# Patient Record
Sex: FEMALE | Race: OTHER | Employment: FULL TIME | URBAN - METROPOLITAN AREA
[De-identification: names, ages, dates, MRNs, and addresses within clinical notes are randomized per-mention and may not be internally consistent; named-entity substitution may affect disease eponyms.]

---

## 2023-04-11 ENCOUNTER — ULTRASOUND ENCOUNTER (OUTPATIENT)
Facility: CLINIC | Age: 28
End: 2023-04-11
Payer: COMMERCIAL

## 2023-04-11 ENCOUNTER — INITIAL PRENATAL (OUTPATIENT)
Facility: CLINIC | Age: 28
End: 2023-04-11
Payer: COMMERCIAL

## 2023-04-11 VITALS
SYSTOLIC BLOOD PRESSURE: 116 MMHG | DIASTOLIC BLOOD PRESSURE: 74 MMHG | HEART RATE: 81 BPM | BODY MASS INDEX: 30.86 KG/M2 | WEIGHT: 157.19 LBS | HEIGHT: 60 IN

## 2023-04-11 DIAGNOSIS — O36.80X0 PREGNANCY WITH INCONCLUSIVE FETAL VIABILITY, SINGLE OR UNSPECIFIED FETUS: Primary | ICD-10-CM

## 2023-04-11 DIAGNOSIS — Z3A.08 8 WEEKS GESTATION OF PREGNANCY: ICD-10-CM

## 2023-04-11 DIAGNOSIS — Z34.01 ENCOUNTER FOR SUPERVISION OF NORMAL FIRST PREGNANCY IN FIRST TRIMESTER: Primary | ICD-10-CM

## 2023-04-11 PROBLEM — O99.210 OBESITY AFFECTING PREGNANCY, ANTEPARTUM: Status: ACTIVE | Noted: 2023-04-11

## 2023-04-11 PROBLEM — O99.210 OBESITY AFFECTING PREGNANCY, ANTEPARTUM (HCC): Status: ACTIVE | Noted: 2023-04-11

## 2023-04-11 PROCEDURE — 87086 URINE CULTURE/COLONY COUNT: CPT | Performed by: OBSTETRICS & GYNECOLOGY

## 2023-04-11 PROCEDURE — 87591 N.GONORRHOEAE DNA AMP PROB: CPT | Performed by: OBSTETRICS & GYNECOLOGY

## 2023-04-11 PROCEDURE — 3074F SYST BP LT 130 MM HG: CPT | Performed by: OBSTETRICS & GYNECOLOGY

## 2023-04-11 PROCEDURE — 3078F DIAST BP <80 MM HG: CPT | Performed by: OBSTETRICS & GYNECOLOGY

## 2023-04-11 PROCEDURE — 87491 CHLMYD TRACH DNA AMP PROBE: CPT | Performed by: OBSTETRICS & GYNECOLOGY

## 2023-04-11 PROCEDURE — 3008F BODY MASS INDEX DOCD: CPT | Performed by: OBSTETRICS & GYNECOLOGY

## 2023-04-11 PROCEDURE — 76801 OB US < 14 WKS SINGLE FETUS: CPT | Performed by: OBSTETRICS & GYNECOLOGY

## 2023-04-11 NOTE — PROGRESS NOTES
New OB  - patient has nausea on and off, declines medication  - denies h/o hepatitis, HSV, blood transfusion  - EDC by LMP c/w 8w6d  - desires NIPT, will get next visit    1.  Obesity  - limit weight gain, diet modifications discussed  - L2U  - NST 36 weeks

## 2023-04-12 LAB
C TRACH DNA SPEC QL NAA+PROBE: NEGATIVE
N GONORRHOEA DNA SPEC QL NAA+PROBE: NEGATIVE

## 2023-04-17 ENCOUNTER — TELEPHONE (OUTPATIENT)
Facility: CLINIC | Age: 28
End: 2023-04-17

## 2023-04-21 ENCOUNTER — LABORATORY ENCOUNTER (OUTPATIENT)
Dept: LAB | Age: 28
End: 2023-04-21
Attending: OBSTETRICS & GYNECOLOGY
Payer: COMMERCIAL

## 2023-04-21 DIAGNOSIS — Z34.01 ENCOUNTER FOR SUPERVISION OF NORMAL FIRST PREGNANCY IN FIRST TRIMESTER: ICD-10-CM

## 2023-04-21 LAB
ALBUMIN SERPL-MCNC: 3.8 G/DL (ref 3.4–5)
ALBUMIN/GLOB SERPL: 0.9 {RATIO} (ref 1–2)
ALP LIVER SERPL-CCNC: 51 U/L
ALT SERPL-CCNC: 29 U/L
ANION GAP SERPL CALC-SCNC: 7 MMOL/L (ref 0–18)
ANTIBODY SCREEN: NEGATIVE
AST SERPL-CCNC: 15 U/L (ref 15–37)
BASOPHILS # BLD AUTO: 0.04 X10(3) UL (ref 0–0.2)
BASOPHILS NFR BLD AUTO: 0.4 %
BILIRUB SERPL-MCNC: 0.8 MG/DL (ref 0.1–2)
BUN BLD-MCNC: 15 MG/DL (ref 7–18)
CALCIUM BLD-MCNC: 9.2 MG/DL (ref 8.5–10.1)
CHLORIDE SERPL-SCNC: 106 MMOL/L (ref 98–112)
CO2 SERPL-SCNC: 23 MMOL/L (ref 21–32)
CREAT BLD-MCNC: 0.61 MG/DL
EOSINOPHIL # BLD AUTO: 0.31 X10(3) UL (ref 0–0.7)
EOSINOPHIL NFR BLD AUTO: 3 %
ERYTHROCYTE [DISTWIDTH] IN BLOOD BY AUTOMATED COUNT: 11.1 %
FASTING STATUS PATIENT QL REPORTED: NO
GFR SERPLBLD BASED ON 1.73 SQ M-ARVRAT: 124 ML/MIN/1.73M2 (ref 60–?)
GLOBULIN PLAS-MCNC: 4.1 G/DL (ref 2.8–4.4)
GLUCOSE BLD-MCNC: 105 MG/DL (ref 70–99)
HBV SURFACE AG SER-ACNC: <0.1 [IU]/L
HBV SURFACE AG SERPL QL IA: NONREACTIVE
HCT VFR BLD AUTO: 35.8 %
HCV AB SERPL QL IA: NONREACTIVE
HGB BLD-MCNC: 12.5 G/DL
IMM GRANULOCYTES # BLD AUTO: 0.03 X10(3) UL (ref 0–1)
IMM GRANULOCYTES NFR BLD: 0.3 %
LYMPHOCYTES # BLD AUTO: 2.24 X10(3) UL (ref 1–4)
LYMPHOCYTES NFR BLD AUTO: 21.4 %
MCH RBC QN AUTO: 32.1 PG (ref 26–34)
MCHC RBC AUTO-ENTMCNC: 34.9 G/DL (ref 31–37)
MCV RBC AUTO: 92 FL
MONOCYTES # BLD AUTO: 0.44 X10(3) UL (ref 0.1–1)
MONOCYTES NFR BLD AUTO: 4.2 %
NEUTROPHILS # BLD AUTO: 7.39 X10 (3) UL (ref 1.5–7.7)
NEUTROPHILS # BLD AUTO: 7.39 X10(3) UL (ref 1.5–7.7)
NEUTROPHILS NFR BLD AUTO: 70.7 %
OSMOLALITY SERPL CALC.SUM OF ELEC: 283 MOSM/KG (ref 275–295)
PLATELET # BLD AUTO: 298 10(3)UL (ref 150–450)
POTASSIUM SERPL-SCNC: 3.4 MMOL/L (ref 3.5–5.1)
PROT SERPL-MCNC: 7.9 G/DL (ref 6.4–8.2)
RBC # BLD AUTO: 3.89 X10(6)UL
RH BLOOD TYPE: POSITIVE
RUBV IGG SER-ACNC: 7 IU/ML (ref 10–?)
SODIUM SERPL-SCNC: 136 MMOL/L (ref 136–145)
T PALLIDUM AB SER QL IA: NONREACTIVE
WBC # BLD AUTO: 10.5 X10(3) UL (ref 4–11)

## 2023-04-21 PROCEDURE — 87340 HEPATITIS B SURFACE AG IA: CPT | Performed by: OBSTETRICS & GYNECOLOGY

## 2023-04-21 PROCEDURE — 85025 COMPLETE CBC W/AUTO DIFF WBC: CPT | Performed by: OBSTETRICS & GYNECOLOGY

## 2023-04-21 PROCEDURE — 86803 HEPATITIS C AB TEST: CPT | Performed by: OBSTETRICS & GYNECOLOGY

## 2023-04-21 PROCEDURE — 83036 HEMOGLOBIN GLYCOSYLATED A1C: CPT | Performed by: OBSTETRICS & GYNECOLOGY

## 2023-04-21 PROCEDURE — 80053 COMPREHEN METABOLIC PANEL: CPT | Performed by: OBSTETRICS & GYNECOLOGY

## 2023-04-21 PROCEDURE — 86780 TREPONEMA PALLIDUM: CPT | Performed by: OBSTETRICS & GYNECOLOGY

## 2023-04-21 PROCEDURE — 86850 RBC ANTIBODY SCREEN: CPT | Performed by: OBSTETRICS & GYNECOLOGY

## 2023-04-21 PROCEDURE — 86901 BLOOD TYPING SEROLOGIC RH(D): CPT | Performed by: OBSTETRICS & GYNECOLOGY

## 2023-04-21 PROCEDURE — 86762 RUBELLA ANTIBODY: CPT | Performed by: OBSTETRICS & GYNECOLOGY

## 2023-04-21 PROCEDURE — 87389 HIV-1 AG W/HIV-1&-2 AB AG IA: CPT | Performed by: OBSTETRICS & GYNECOLOGY

## 2023-04-21 PROCEDURE — 86900 BLOOD TYPING SEROLOGIC ABO: CPT | Performed by: OBSTETRICS & GYNECOLOGY

## 2023-04-23 LAB — HGBA1C: <4.2 %

## 2023-04-24 NOTE — PROGRESS NOTES
Discussed providers message: results- rubella equivocal and recommendation recheck rubella after delivery, patient verbalized understanding.

## 2023-05-09 ENCOUNTER — ROUTINE PRENATAL (OUTPATIENT)
Facility: CLINIC | Age: 28
End: 2023-05-09
Payer: COMMERCIAL

## 2023-05-09 ENCOUNTER — TELEPHONE (OUTPATIENT)
Facility: CLINIC | Age: 28
End: 2023-05-09

## 2023-05-09 VITALS
HEART RATE: 103 BPM | WEIGHT: 152 LBS | SYSTOLIC BLOOD PRESSURE: 102 MMHG | BODY MASS INDEX: 29.84 KG/M2 | HEIGHT: 60 IN | DIASTOLIC BLOOD PRESSURE: 62 MMHG

## 2023-05-09 DIAGNOSIS — Z34.01 ENCOUNTER FOR SUPERVISION OF NORMAL FIRST PREGNANCY IN FIRST TRIMESTER: Primary | ICD-10-CM

## 2023-05-09 LAB
GLUCOSE (URINE DIPSTICK): NEGATIVE MG/DL
MULTISTIX LOT#: NORMAL NUMERIC

## 2023-05-09 PROCEDURE — 81002 URINALYSIS NONAUTO W/O SCOPE: CPT | Performed by: OBSTETRICS & GYNECOLOGY

## 2023-05-09 PROCEDURE — 3078F DIAST BP <80 MM HG: CPT | Performed by: OBSTETRICS & GYNECOLOGY

## 2023-05-09 PROCEDURE — 3074F SYST BP LT 130 MM HG: CPT | Performed by: OBSTETRICS & GYNECOLOGY

## 2023-05-09 PROCEDURE — 3008F BODY MASS INDEX DOCD: CPT | Performed by: OBSTETRICS & GYNECOLOGY

## 2023-05-09 NOTE — TELEPHONE ENCOUNTER
Name//spelling verified by pt review of label  Tubes labeled legible in front of patient  NIPS drawn, pt tolerated well. INVITAE access, cost, result discussed. Pt. Daniela Rodas. Specimen collected and placed in bin at clinical workstation for further processing by clinical staff.       Pablito Espitia NIPT [[id number]]  Order signed by Dr. Rhiannon Cedeño

## 2023-05-16 ENCOUNTER — TELEPHONE (OUTPATIENT)
Facility: CLINIC | Age: 28
End: 2023-05-16

## 2023-05-16 LAB
AMB EXT MYRIAD TRISOMY 13: NEGATIVE
AMB EXT MYRIAD TRISOMY 18: NEGATIVE
AMB EXT MYRIAD TRISOMY 21: NEGATIVE

## 2023-05-16 NOTE — TELEPHONE ENCOUNTER
Gender result released in [address] [place] [address]. Patient verbalized understanding, agreed to and intend to comply with plan of care.

## 2023-06-07 PROBLEM — O09.899 RUBELLA NON-IMMUNE STATUS, ANTEPARTUM: Status: ACTIVE | Noted: 2023-06-07

## 2023-06-07 PROBLEM — O99.212 OBESITY AFFECTING PREGNANCY IN SECOND TRIMESTER (HCC): Status: ACTIVE | Noted: 2023-04-11

## 2023-06-07 PROBLEM — Z28.39 RUBELLA NON-IMMUNE STATUS, ANTEPARTUM (HCC): Status: ACTIVE | Noted: 2023-06-07

## 2023-06-07 PROBLEM — Z28.39 RUBELLA NON-IMMUNE STATUS, ANTEPARTUM: Status: ACTIVE | Noted: 2023-06-07

## 2023-06-07 PROBLEM — O09.899 RUBELLA NON-IMMUNE STATUS, ANTEPARTUM (HCC): Status: ACTIVE | Noted: 2023-06-07

## 2023-06-07 PROBLEM — O99.212 OBESITY AFFECTING PREGNANCY IN SECOND TRIMESTER: Status: ACTIVE | Noted: 2023-04-11

## 2023-06-07 PROBLEM — Z34.02 PREGNANCY, FIRST, SECOND TRIMESTER (HCC): Status: ACTIVE | Noted: 2023-06-07

## 2023-06-07 PROBLEM — Z34.02 PREGNANCY, FIRST, SECOND TRIMESTER: Status: ACTIVE | Noted: 2023-06-07

## 2023-06-07 PROBLEM — E66.3 OVERWEIGHT (BMI 25.0-29.9): Status: ACTIVE | Noted: 2023-04-11

## 2023-06-08 ENCOUNTER — ROUTINE PRENATAL (OUTPATIENT)
Facility: CLINIC | Age: 28
End: 2023-06-08
Payer: COMMERCIAL

## 2023-06-08 ENCOUNTER — TELEPHONE (OUTPATIENT)
Facility: CLINIC | Age: 28
End: 2023-06-08

## 2023-06-08 VITALS
BODY MASS INDEX: 30.43 KG/M2 | SYSTOLIC BLOOD PRESSURE: 114 MMHG | DIASTOLIC BLOOD PRESSURE: 70 MMHG | WEIGHT: 155 LBS | HEART RATE: 104 BPM | HEIGHT: 60 IN

## 2023-06-08 DIAGNOSIS — Z3A.17 17 WEEKS GESTATION OF PREGNANCY: ICD-10-CM

## 2023-06-08 DIAGNOSIS — O09.899 RUBELLA NON-IMMUNE STATUS, ANTEPARTUM: Primary | ICD-10-CM

## 2023-06-08 DIAGNOSIS — E66.3 OVERWEIGHT (BMI 25.0-29.9): ICD-10-CM

## 2023-06-08 DIAGNOSIS — Z36.89 ENCOUNTER FOR FETAL ANATOMIC SURVEY: Primary | ICD-10-CM

## 2023-06-08 DIAGNOSIS — Z36.89 ENCOUNTER FOR FETAL ANATOMIC SURVEY: ICD-10-CM

## 2023-06-08 DIAGNOSIS — Z34.02 PREGNANCY, FIRST, SECOND TRIMESTER: ICD-10-CM

## 2023-06-08 DIAGNOSIS — Z28.39 RUBELLA NON-IMMUNE STATUS, ANTEPARTUM: Primary | ICD-10-CM

## 2023-06-08 LAB
GLUCOSE (URINE DIPSTICK): NEGATIVE MG/DL
MULTISTIX LOT#: NORMAL NUMERIC
PROTEIN (URINE DIPSTICK): NEGATIVE MG/DL

## 2023-06-08 PROCEDURE — 81002 URINALYSIS NONAUTO W/O SCOPE: CPT | Performed by: OBSTETRICS & GYNECOLOGY

## 2023-06-08 PROCEDURE — 3008F BODY MASS INDEX DOCD: CPT | Performed by: OBSTETRICS & GYNECOLOGY

## 2023-06-08 PROCEDURE — 3078F DIAST BP <80 MM HG: CPT | Performed by: OBSTETRICS & GYNECOLOGY

## 2023-06-08 PROCEDURE — 3074F SYST BP LT 130 MM HG: CPT | Performed by: OBSTETRICS & GYNECOLOGY

## 2023-06-08 NOTE — PROGRESS NOTES
HAYDEN    Partner here  Patient is a teacher   No FM. No complaints    32year old  at 17w2d  LESLEE: 11/15/23 by LMP c/w 8w6d US  A+    -cfDNA neg, GIRL   -carrier screen - declines   -AFP - will consider   -Anatomy US at 20 wk discussed    Currently our ultrasonographer is on indeterminate leave   Options: Radiology Dept, [facility], OB office at [facility]   Patient prefers - [facility]. Order placed.    -classes, pre-registration, pediatrician, breast pump info    1. Overweight (pre-preg BMI 28)  -wt gain goal 15-25 lb. Up 10 lb. Discussed    2.  Rubella non immune  -MMR postpartum discussed     RTC 4 wk

## 2023-06-08 NOTE — PATIENT INSTRUCTIONS
AFP Level     There is an optional blood test that we can perform on you called \"AFP level. \" It is a chemical in the bloodstream produced by the pregnancy. It is done between 15-20 weeks gestation. The ideal time for testing is actually 16-18 weeks gestation. If the level is abnormally elevated, this can indicate the presence of abnormalities of the development of the brain and spinal cord such as anencephaly (lack of brain development) and spina bifida (exposed spinal cord). These anomalies can generally be seen on ultrasound. It can also be elevated in cases of normal fetal anatomy and can indicate an abnormal placenta. This may or may not be able to be detected on ultrasound. Please think about whether or not you would like to have this test done. When you decide when you would like to have this test done, please let us know. We must enter your exact gestational age and weight on the date of the blood draw for the test to be calculated accurately. Schedule 20 week fetal anatomy ultrasound    Limit weight gain 15-25 lb for entire pregnancy    You are not immune to [place]. An MMR vaccine will be offered to you in the hospital after delivery.

## 2023-06-08 NOTE — TELEPHONE ENCOUNTER
Cannot schedule fetal anatomy [facility]. They lost their ultrasonographer as well and no longer can do these exams. Order for [facility] Radiology placed. Diagnoses and all orders for this visit:    Encounter for fetal anatomic survey  -     US PREG 2ND 3RD TRIMESTER (CPT=76805);  Future

## 2023-06-08 NOTE — TELEPHONE ENCOUNTER
Pt states [facility] lost their sonographer that can do anatomy scans; pls place new order for [facility] Radiology; pls call pt when available to schedule.

## 2023-06-08 NOTE — TELEPHONE ENCOUNTER
Spoke with patient. She is aware Dr Emerita Bolanos placed a new order for her US to be done at [facility] radiology. Will call the schedule. Understanding verbalized.

## 2023-06-23 ENCOUNTER — TELEPHONE (OUTPATIENT)
Facility: CLINIC | Age: 28
End: 2023-06-23

## 2023-06-23 NOTE — TELEPHONE ENCOUNTER
Breast Pump order was received from [facility] [employer]. / [facility]. Order form was placed in doctors bin for signature.

## 2023-07-05 ENCOUNTER — HOSPITAL ENCOUNTER (OUTPATIENT)
Dept: ULTRASOUND IMAGING | Age: 28
Discharge: HOME | End: 2023-07-05
Attending: OBSTETRICS & GYNECOLOGY
Payer: COMMERCIAL

## 2023-07-05 DIAGNOSIS — Z36.89 ENCOUNTER FOR FETAL ANATOMIC SURVEY: ICD-10-CM

## 2023-07-05 PROCEDURE — 76805 OB US >/= 14 WKS SNGL FETUS: CPT | Performed by: OBSTETRICS & GYNECOLOGY

## 2023-07-07 ENCOUNTER — ROUTINE PRENATAL (OUTPATIENT)
Dept: OBGYN CLINIC | Facility: CLINIC | Age: 28
End: 2023-07-07
Payer: COMMERCIAL

## 2023-07-07 VITALS
WEIGHT: 158.75 LBS | BODY MASS INDEX: 31.17 KG/M2 | HEART RATE: 79 BPM | DIASTOLIC BLOOD PRESSURE: 72 MMHG | HEIGHT: 60 IN | SYSTOLIC BLOOD PRESSURE: 113 MMHG

## 2023-07-07 DIAGNOSIS — Z34.02 ENCOUNTER FOR SUPERVISION OF NORMAL FIRST PREGNANCY IN SECOND TRIMESTER: Primary | ICD-10-CM

## 2023-07-07 PROCEDURE — 3008F BODY MASS INDEX DOCD: CPT

## 2023-07-07 PROCEDURE — 3074F SYST BP LT 130 MM HG: CPT

## 2023-07-07 PROCEDURE — 81002 URINALYSIS NONAUTO W/O SCOPE: CPT

## 2023-07-07 PROCEDURE — 3078F DIAST BP <80 MM HG: CPT

## 2023-07-07 NOTE — PROGRESS NOTES
HAYDEN 21w2d    Pt doing well, no complaints     LESLEE: 11/15/23 by LMP c/w 8w6d US     -cfDNA neg, GIRL   -carrier screen - declines   -AFP - will consider   -Anatomy US at 20 wk -normal  -classes, pre-registration, pediatrician, breast pump info at prior visit      1. Overweight (pre-preg BMI 28)  -wt gain goal 15-25 lb. Up 10 lb. Discussed     2.  Rubella non immune  -MMR postpartum discussed

## 2023-07-27 ENCOUNTER — TELEPHONE (OUTPATIENT)
Facility: CLINIC | Age: 28
End: 2023-07-27

## 2023-07-27 NOTE — TELEPHONE ENCOUNTER
Pregnancy confirmation faxed to [employer] [facility] per pt's request.     Pt aware. Patient verbalized understanding.

## 2023-07-27 NOTE — TELEPHONE ENCOUNTER
Patient needs a letter with the LESLEE to the employer. Needs to be faxed.    Fax number: [phone]

## 2023-08-04 ENCOUNTER — ROUTINE PRENATAL (OUTPATIENT)
Facility: CLINIC | Age: 28
End: 2023-08-04
Payer: COMMERCIAL

## 2023-08-04 VITALS
HEIGHT: 60 IN | HEART RATE: 69 BPM | SYSTOLIC BLOOD PRESSURE: 100 MMHG | WEIGHT: 159.63 LBS | DIASTOLIC BLOOD PRESSURE: 60 MMHG | BODY MASS INDEX: 31.34 KG/M2

## 2023-08-04 DIAGNOSIS — Z34.92 ENCOUNTER FOR SUPERVISION OF NORMAL PREGNANCY IN SECOND TRIMESTER, UNSPECIFIED GRAVIDITY: Primary | ICD-10-CM

## 2023-08-04 PROCEDURE — 3074F SYST BP LT 130 MM HG: CPT | Performed by: OBSTETRICS & GYNECOLOGY

## 2023-08-04 PROCEDURE — 3008F BODY MASS INDEX DOCD: CPT | Performed by: OBSTETRICS & GYNECOLOGY

## 2023-08-04 PROCEDURE — 3078F DIAST BP <80 MM HG: CPT | Performed by: OBSTETRICS & GYNECOLOGY

## 2023-08-04 PROCEDURE — 81002 URINALYSIS NONAUTO W/O SCOPE: CPT | Performed by: OBSTETRICS & GYNECOLOGY

## 2023-08-04 NOTE — PROGRESS NOTES
She is aware that she needs a pediatrician has taken some classes through the hospital.  Her grandmother had diabetes the GCT and CBC will be ordered. No swelling.

## 2023-08-10 ENCOUNTER — TELEPHONE (OUTPATIENT)
Facility: CLINIC | Age: 28
End: 2023-08-10

## 2023-08-10 NOTE — TELEPHONE ENCOUNTER
Patient would like a note Faxed to her employer HR dept stating her estimated due date  Attention: Tete Tirado   Fax [phone]

## 2023-08-15 ENCOUNTER — LAB ENCOUNTER (OUTPATIENT)
Dept: LAB | Age: 28
End: 2023-08-15
Attending: OBSTETRICS & GYNECOLOGY
Payer: COMMERCIAL

## 2023-08-15 DIAGNOSIS — Z34.92 ENCOUNTER FOR SUPERVISION OF NORMAL PREGNANCY IN SECOND TRIMESTER, UNSPECIFIED GRAVIDITY: ICD-10-CM

## 2023-08-15 LAB
BASOPHILS # BLD AUTO: 0.03 X10(3) UL (ref 0–0.2)
BASOPHILS NFR BLD AUTO: 0.3 %
EOSINOPHIL # BLD AUTO: 0.36 X10(3) UL (ref 0–0.7)
EOSINOPHIL NFR BLD AUTO: 3.4 %
ERYTHROCYTE [DISTWIDTH] IN BLOOD BY AUTOMATED COUNT: 11.8 %
GLUCOSE 1H P GLC SERPL-MCNC: 116 MG/DL
HCT VFR BLD AUTO: 34.1 %
HGB BLD-MCNC: 11.8 G/DL
IMM GRANULOCYTES # BLD AUTO: 0.06 X10(3) UL (ref 0–1)
IMM GRANULOCYTES NFR BLD: 0.6 %
LYMPHOCYTES # BLD AUTO: 1.58 X10(3) UL (ref 1–4)
LYMPHOCYTES NFR BLD AUTO: 14.8 %
MCH RBC QN AUTO: 32.9 PG (ref 26–34)
MCHC RBC AUTO-ENTMCNC: 34.6 G/DL (ref 31–37)
MCV RBC AUTO: 95 FL
MONOCYTES # BLD AUTO: 0.53 X10(3) UL (ref 0.1–1)
MONOCYTES NFR BLD AUTO: 5 %
NEUTROPHILS # BLD AUTO: 8.09 X10 (3) UL (ref 1.5–7.7)
NEUTROPHILS # BLD AUTO: 8.09 X10(3) UL (ref 1.5–7.7)
NEUTROPHILS NFR BLD AUTO: 75.9 %
PLATELET # BLD AUTO: 269 10(3)UL (ref 150–450)
RBC # BLD AUTO: 3.59 X10(6)UL
WBC # BLD AUTO: 10.7 X10(3) UL (ref 4–11)

## 2023-08-15 PROCEDURE — 85025 COMPLETE CBC W/AUTO DIFF WBC: CPT

## 2023-08-15 PROCEDURE — 36415 COLL VENOUS BLD VENIPUNCTURE: CPT

## 2023-08-15 PROCEDURE — 87389 HIV-1 AG W/HIV-1&-2 AB AG IA: CPT

## 2023-08-15 PROCEDURE — 82950 GLUCOSE TEST: CPT

## 2023-08-23 ENCOUNTER — ROUTINE PRENATAL (OUTPATIENT)
Dept: OBGYN CLINIC | Facility: CLINIC | Age: 28
End: 2023-08-23
Payer: COMMERCIAL

## 2023-08-23 VITALS
HEIGHT: 60 IN | SYSTOLIC BLOOD PRESSURE: 121 MMHG | BODY MASS INDEX: 32.02 KG/M2 | DIASTOLIC BLOOD PRESSURE: 79 MMHG | HEART RATE: 86 BPM | WEIGHT: 163.13 LBS

## 2023-08-23 DIAGNOSIS — Z34.03 ENCOUNTER FOR SUPERVISION OF NORMAL FIRST PREGNANCY IN THIRD TRIMESTER: Primary | ICD-10-CM

## 2023-08-23 PROCEDURE — 90471 IMMUNIZATION ADMIN: CPT | Performed by: STUDENT IN AN ORGANIZED HEALTH CARE EDUCATION/TRAINING PROGRAM

## 2023-08-23 PROCEDURE — 3008F BODY MASS INDEX DOCD: CPT | Performed by: STUDENT IN AN ORGANIZED HEALTH CARE EDUCATION/TRAINING PROGRAM

## 2023-08-23 PROCEDURE — 90715 TDAP VACCINE 7 YRS/> IM: CPT | Performed by: STUDENT IN AN ORGANIZED HEALTH CARE EDUCATION/TRAINING PROGRAM

## 2023-08-23 PROCEDURE — 3078F DIAST BP <80 MM HG: CPT | Performed by: STUDENT IN AN ORGANIZED HEALTH CARE EDUCATION/TRAINING PROGRAM

## 2023-08-23 PROCEDURE — 3074F SYST BP LT 130 MM HG: CPT | Performed by: STUDENT IN AN ORGANIZED HEALTH CARE EDUCATION/TRAINING PROGRAM

## 2023-08-23 NOTE — PROGRESS NOTES
HAYDEN - 28w0d     Pt doing well, had a few \"hot flashes\" but otherwise no complaints     LESLEE: 11/15/23 by LMP c/w 8w6d US     -cfDNA neg, GIRL   -carrier screen - declines   -Anatomy US at 20 wk -normal  -1h GTT, CBC, HIV done  -classes, pre-registration, pediatrician, breast pump info at prior visit   -Tdap done     1. Overweight (pre-preg BMI 28)  -wt gain goal 15-25 lb Discussed     2.  Rubella non immune  -MMR postpartum discussed

## 2023-08-28 ENCOUNTER — TELEPHONE (OUTPATIENT)
Facility: CLINIC | Age: 28
End: 2023-08-28

## 2023-09-05 ENCOUNTER — ROUTINE PRENATAL (OUTPATIENT)
Facility: CLINIC | Age: 28
End: 2023-09-05
Payer: COMMERCIAL

## 2023-09-05 VITALS
WEIGHT: 162 LBS | BODY MASS INDEX: 32 KG/M2 | SYSTOLIC BLOOD PRESSURE: 100 MMHG | HEART RATE: 77 BPM | DIASTOLIC BLOOD PRESSURE: 62 MMHG

## 2023-09-05 DIAGNOSIS — Z34.03 ENCOUNTER FOR SUPERVISION OF NORMAL FIRST PREGNANCY IN THIRD TRIMESTER: Primary | ICD-10-CM

## 2023-09-05 PROCEDURE — 3074F SYST BP LT 130 MM HG: CPT

## 2023-09-05 PROCEDURE — 3078F DIAST BP <80 MM HG: CPT

## 2023-09-05 NOTE — PROGRESS NOTES
HAYDEN 29w6d    She is feeling good, no complaints     LESLEE: 11/15/23 by LMP c/w 8w6d US     -cfDNA neg, GIRL   -carrier screen - declines   -Anatomy US at 20 wk -normal  -1h GTT, CBC, HIV done  -classes, pre-registration, pediatrician, breast pump info at prior visit   -Tdap done     1. Overweight (pre-preg BMI 28)  -wt gain goal 15-25 lb Discussed     2.  Rubella non immune  -MMR postpartum discussed

## 2023-09-06 NOTE — TELEPHONE ENCOUNTER
Called patient he will have labs done in [facility]    O.V. summary received and placed in doctors bin for review and recommendations.

## 2023-09-18 ENCOUNTER — ROUTINE PRENATAL (OUTPATIENT)
Facility: CLINIC | Age: 28
End: 2023-09-18
Payer: COMMERCIAL

## 2023-09-18 VITALS
WEIGHT: 165.63 LBS | BODY MASS INDEX: 32.52 KG/M2 | HEART RATE: 78 BPM | HEIGHT: 60 IN | SYSTOLIC BLOOD PRESSURE: 116 MMHG | DIASTOLIC BLOOD PRESSURE: 72 MMHG

## 2023-09-18 DIAGNOSIS — Z34.03 ENCOUNTER FOR SUPERVISION OF NORMAL FIRST PREGNANCY IN THIRD TRIMESTER: Primary | ICD-10-CM

## 2023-10-03 ENCOUNTER — ROUTINE PRENATAL (OUTPATIENT)
Facility: CLINIC | Age: 28
End: 2023-10-03
Payer: COMMERCIAL

## 2023-10-03 VITALS
DIASTOLIC BLOOD PRESSURE: 60 MMHG | SYSTOLIC BLOOD PRESSURE: 110 MMHG | BODY MASS INDEX: 33.45 KG/M2 | WEIGHT: 170.38 LBS | HEIGHT: 60 IN | HEART RATE: 64 BPM

## 2023-10-03 DIAGNOSIS — Z23 NEED FOR VACCINATION: ICD-10-CM

## 2023-10-03 DIAGNOSIS — Z34.03 ENCOUNTER FOR SUPERVISION OF NORMAL FIRST PREGNANCY IN THIRD TRIMESTER: Primary | ICD-10-CM

## 2023-10-03 PROCEDURE — 3078F DIAST BP <80 MM HG: CPT

## 2023-10-03 PROCEDURE — 90471 IMMUNIZATION ADMIN: CPT | Performed by: OBSTETRICS & GYNECOLOGY

## 2023-10-03 PROCEDURE — 90686 IIV4 VACC NO PRSV 0.5 ML IM: CPT | Performed by: OBSTETRICS & GYNECOLOGY

## 2023-10-03 PROCEDURE — 3074F SYST BP LT 130 MM HG: CPT

## 2023-10-03 PROCEDURE — 3008F BODY MASS INDEX DOCD: CPT

## 2023-10-03 NOTE — PROGRESS NOTES
HAYDEN 33w6d    She has some swelling in lower extremities - reassured patient. LESLEE: 11/15/23 by LMP c/w 8w6d US     -cfDNA neg, GIRL   -carrier screen - declines   -Anatomy US at 20 wk -normal  -1h GTT, CBC, HIV done  -classes, pre-registration, pediatrician, breast pump - done  -Tdap done  -flu vaccination given today      1. Overweight (pre-preg BMI 28)  -wt gain goal 15-25 lb Discussed     2.  Rubella non immune  -MMR postpartum discussed

## 2023-10-19 ENCOUNTER — ROUTINE PRENATAL (OUTPATIENT)
Dept: OBGYN CLINIC | Facility: CLINIC | Age: 28
End: 2023-10-19
Payer: COMMERCIAL

## 2023-10-19 VITALS
BODY MASS INDEX: 33.33 KG/M2 | HEART RATE: 86 BPM | SYSTOLIC BLOOD PRESSURE: 136 MMHG | WEIGHT: 169.75 LBS | DIASTOLIC BLOOD PRESSURE: 87 MMHG | HEIGHT: 60 IN

## 2023-10-19 DIAGNOSIS — Z34.03 ENCOUNTER FOR SUPERVISION OF NORMAL FIRST PREGNANCY IN THIRD TRIMESTER: Primary | ICD-10-CM

## 2023-10-19 DIAGNOSIS — Z36.89 DETERMINE FETAL PRESENTATION USING ULTRASOUND: ICD-10-CM

## 2023-10-19 PROCEDURE — 3075F SYST BP GE 130 - 139MM HG: CPT | Performed by: STUDENT IN AN ORGANIZED HEALTH CARE EDUCATION/TRAINING PROGRAM

## 2023-10-19 PROCEDURE — 87653 STREP B DNA AMP PROBE: CPT | Performed by: STUDENT IN AN ORGANIZED HEALTH CARE EDUCATION/TRAINING PROGRAM

## 2023-10-19 PROCEDURE — 87081 CULTURE SCREEN ONLY: CPT | Performed by: STUDENT IN AN ORGANIZED HEALTH CARE EDUCATION/TRAINING PROGRAM

## 2023-10-19 PROCEDURE — 3079F DIAST BP 80-89 MM HG: CPT | Performed by: STUDENT IN AN ORGANIZED HEALTH CARE EDUCATION/TRAINING PROGRAM

## 2023-10-19 PROCEDURE — 3008F BODY MASS INDEX DOCD: CPT | Performed by: STUDENT IN AN ORGANIZED HEALTH CARE EDUCATION/TRAINING PROGRAM

## 2023-10-19 NOTE — PROGRESS NOTES
HAYDEN - 36w1d   Pt doing well, no major complaints. Baby active  SVE - closed, unable to tell presentation - will get US with next visit      LESLEE: 11/15/23 by LMP c/w 8w6d US     -cfDNA neg, GIRL   -carrier screen - declines   -Anatomy US at 20 wk -normal  -1h GTT, CBC, HIV done  -classes, pre-registration, pediatrician, breast pump - done  -Tdap done  -flu vaccination given  -GBS done     1. Overweight (pre-preg BMI 28)  -wt gain goal 15-25 lb Discussed     2.  Rubella non immune  -MMR postpartum discussed

## 2023-10-21 LAB — GROUP B STREP BY PCR FOR PCR OVT: NEGATIVE

## 2023-10-25 ENCOUNTER — ROUTINE PRENATAL (OUTPATIENT)
Facility: CLINIC | Age: 28
End: 2023-10-25

## 2023-10-25 ENCOUNTER — ULTRASOUND ENCOUNTER (OUTPATIENT)
Facility: CLINIC | Age: 28
End: 2023-10-25

## 2023-10-25 VITALS
SYSTOLIC BLOOD PRESSURE: 122 MMHG | DIASTOLIC BLOOD PRESSURE: 76 MMHG | HEART RATE: 75 BPM | WEIGHT: 171.81 LBS | HEIGHT: 60 IN | BODY MASS INDEX: 33.73 KG/M2

## 2023-10-25 DIAGNOSIS — Z34.03 ENCOUNTER FOR SUPERVISION OF NORMAL FIRST PREGNANCY IN THIRD TRIMESTER: Primary | ICD-10-CM

## 2023-10-25 PROCEDURE — 3078F DIAST BP <80 MM HG: CPT

## 2023-10-25 PROCEDURE — 76816 OB US FOLLOW-UP PER FETUS: CPT | Performed by: OBSTETRICS & GYNECOLOGY

## 2023-10-25 PROCEDURE — 3074F SYST BP LT 130 MM HG: CPT

## 2023-10-25 PROCEDURE — 3008F BODY MASS INDEX DOCD: CPT

## 2023-10-25 NOTE — PROGRESS NOTES
HAYDEN 37w0d    She is doing well, no complaints. SVE - closed/ 40/-3 - US done today to check presentation - results pending. LESLEE: 11/15/23 by LMP c/w 8w6d US     -cfDNA neg, GIRL   -carrier screen - declines   -Anatomy US at 20 wk -normal  -1h GTT, CBC, HIV done  -classes, pre-registration, pediatrician, breast pump - done  -Tdap done  -flu vaccination given  -GBS done     1. Overweight (pre-preg BMI 28)  -wt gain goal 15-25 lb Discussed     2.  Rubella non immune  -MMR postpartum discussed

## 2023-10-31 ENCOUNTER — ROUTINE PRENATAL (OUTPATIENT)
Facility: CLINIC | Age: 28
End: 2023-10-31

## 2023-10-31 VITALS
HEART RATE: 60 BPM | BODY MASS INDEX: 33.54 KG/M2 | DIASTOLIC BLOOD PRESSURE: 82 MMHG | SYSTOLIC BLOOD PRESSURE: 110 MMHG | HEIGHT: 60 IN | WEIGHT: 170.81 LBS

## 2023-10-31 DIAGNOSIS — E66.3 OVERWEIGHT (BMI 25.0-29.9): ICD-10-CM

## 2023-10-31 DIAGNOSIS — O09.899 RUBELLA NON-IMMUNE STATUS, ANTEPARTUM: ICD-10-CM

## 2023-10-31 DIAGNOSIS — Z28.39 RUBELLA NON-IMMUNE STATUS, ANTEPARTUM: ICD-10-CM

## 2023-10-31 DIAGNOSIS — Z36.89 DETERMINE FETAL PRESENTATION USING ULTRASOUND: ICD-10-CM

## 2023-10-31 DIAGNOSIS — Z34.03 PREGNANCY, FIRST, THIRD TRIMESTER: ICD-10-CM

## 2023-10-31 DIAGNOSIS — O26.03 EXCESSIVE WEIGHT GAIN DURING PREGNANCY IN THIRD TRIMESTER: Primary | ICD-10-CM

## 2023-10-31 PROCEDURE — 3008F BODY MASS INDEX DOCD: CPT | Performed by: OBSTETRICS & GYNECOLOGY

## 2023-10-31 PROCEDURE — 3079F DIAST BP 80-89 MM HG: CPT | Performed by: OBSTETRICS & GYNECOLOGY

## 2023-10-31 PROCEDURE — 3074F SYST BP LT 130 MM HG: CPT | Performed by: OBSTETRICS & GYNECOLOGY

## 2023-10-31 NOTE — PATIENT INSTRUCTIONS
Consider induction of labor  Induction of labor at 39-40 wk. Recent studies show best outcomes for mothers and infants in most pregnancies with delivery at this gestational age. Fetal lung maturity should be adequate. Avoiding additional fetal weight gain and an aging placenta are the other benefits. There is a lower  rate for inductions between 39-40 weeks compared with expectant management at that time. Erythromycin eye ointment     In the , the risk of richard gonococcal conjunctivitis is reduced by prophylactic administration of ophthalmic antibiotic agents shortly after birth. Ocular prophylaxis is safe, easy to administer, and an inexpensive method to prevent sight-threatening gonococcal ophthalmia. In the [place], routine prophylaxis against gonococcal ophthalmia neonatorum with erythromycin ophthalmic ointment is recommended by the [employer], the [employer] of Pediatrics (AAP), and the [employer] for Disease Control and Prevention (CDC). The ophthalmic antibiotic should be applied in each eye within two hours of birth, regardless of mode of delivery. UpToDate accessed 2023    Vitamin K injection     Prophylactic administration of vitamin K (as a single dose given intramuscularly within 6 hours of birth) is recommended for all newborns to prevent vitamin K deficient bleeding (VKDB; previously referred to as hemorrhagic disease of the ). If you choose to refuse vitamin K for your (s) you will be asked to sign a consent form at the hospital that will include the following:    Informed Refusal of Vitamin K for the [place]  At birth, all infants are deficient in Vitamin K. Vitamin K is necessary for blood to form a clot. Vitamin  K deficiency can result in bleeding during the first days or weeks of life (hemorrhagic disease of the )  and infants who are exclusively breast fed are at greatest risk.  In the most severe cases, the bleeding occurs  into the brain (4 to 7 / 100,000 births) and can result in permanent brain damage. The American Academy of  Pediatrics recommends a Vitamin K injection at birth (into the tissue beneath the skin) which can greatly  reduce the risk of hemorrhage. Oral forms are not readily available in the [place] and have not been shown to be  as effective as the injection. I have read the above information provided and have had all my questions answered regarding my  decision to refuse the treatment of my  infant on the grounds that the treatment violates my  personal/[employer] beliefs. I understand that my refusal to consent to the recommended treatment may  seriously impair the health of my infant up to and including bleeding into the brain or death. While I  understand the possible consequences, I nevertheless refuse to submit the recommended treatment. I  assume responsibility for the consequences of this refusal and release [employer] [employer] [employer], its affiliates  and subsidiaries, its employees and agents and the physicians or midwifes from any and all liability associated  with my refusal to permit treatment. More information:     A single IM dose of vitamin K appears to be more effective in preventing late-onset VKDB compared with oral vitamin K regimens. Parental refusal - The rate of parental refusal of  vitamin K appears to be increasing in the [place] and other countries. Approximately one-half of severe cases of VKDB are associated with parental refusal of vitamin K therapy during the birth hospitalization.  In a national surveillance study from [facility], there were six cases of fatal VKDB-related intracranial hemorrhage reported over 24 years; three of the six cases occurred in infants who were not given vitamin K due to parental refusal.    It is imperative that parents are counseled about the potential dire consequences of VKDB (eg, intestinal hemorrhage, intracranial hemorrhage (stroke) with subsequent neurodevelopmental impairment and possible death) if their  does not receive vitamin K. VITAMIN K-DEFICIENT BLEEDING IN NEWBORNS AND YOUNG INFANTS  Vitamin K deficiency is common in the , and if vitamin K is not replaced, the infant is at risk for vitamin K deficiency bleeding (VKDB), previously known as hemorrhagic disease of the . Pathogenesis --  infants are at risk for vitamin K deficiency because their immature liver does not efficiently utilize vitamin K. In addition, they tend to have low vitamin K stores because of the low vitamin K content of breast milk, a sterile gut, and poor placental transfer of vitamin K. In infants, the plasma concentrations of all vitamin K-dependent factors are approximately 20 percent of the adult values. Within a month after birth, the levels rise to within normal limits. Clinical features -- VKDB is characterized by cutaneous bruising or bleeding from mucosal surfaces, the gastrointestinal tract, umbilicus or circumcision site, and/or intracranial hemorrhage (ICH) (stroke). ? Early-onset VKDB develops within the first 24 hours of life and is usually associated with maternal medications that block vitamin K action (eg, anticonvulsants). It is associated with ICH in approximately 25 percent of affected infants     ? Classic VKDB develops between the second and seventh day of life and is largely prevented by administration of vitamin K at birth     ? Late-onset VKDB typically develops between three weeks and eight months of age.  There is a high frequency of ICH in affected infants (eg, 50 percent in some series), and associated central nervous system symptoms such as vomiting or seizures may be the primary presenting symptoms     Late-onset VKDB and associated ICH appear to be increasing in the [place], and these cases are associated with the parental refusal of vitamin K prophylaxis at birth, followed by exclusive breast feeding. Evaluation -- The possibility of VKDB should be considered in an infant presenting during the first six months of life with bleeding, bruising, lethargy, or fussiness, especially if they are exclusively  or did not receive vitamin K prophylaxis at birth. If there is a strong clinical suspicion of VKDB (eg, clinically apparent bleeding and known refusal of vitamin K prophylaxis), the infant should be treated immediately, even before test results are available. Evaluation should include laboratory testing of prothrombin time (PT) and international normalized ratio (INR), both of which are prolonged in vitamin K deficiency. Infants with abnormal results or neurologic symptoms should have urgent neuroimaging to detect brain hemorrhage. A complete blood count with platelets and fibrinogen should also be performed to exclude other causes of bleeding. Prevention -- All  infants are given vitamin K shortly after birth to prevent VKDB. Treatment -- A presumptive diagnosis of VKDB should be made in an infant presenting with bleeding or neurologic symptoms and either prolonged PT or INR, or a history of not receiving vitamin K prophylaxis at birth. Such infants should be treated immediately with parenteral vitamin K (phytonadione), 1 to 2 mg intravenously or subcutaneously. The vitamin K dose should normalize the coagulation profile within two to three hours. For severe bleeding episodes, fresh frozen plasma or prothrombin complex concentration may be administered in addition to vitamin K.     UpToDate accessed 2023

## 2023-10-31 NOTE — PROGRESS NOTES
Ramesh Mary [id number]    Partner here   +FM. Mild tightenings. No leaking fluid or vaginal bleeding. 32year old  at 41w10d   LESLEE: 11/15/23 by LMP c/w 8w6d US  A+/GBS neg     -cfDNA neg, GIRL   -carrier screen - declines   -Anatomy US normal   -classes, pre-registration, pediatrician, breast pump, car-seat discussed   -vitamin K & erythromycin eye ointment discussed.   -Tdap & Flu done  -New  COVID booster encouraged   -Cervix fingertip (could push finger through to 1 ES)/78%/-7, cephalic  at 2:72 PM     IOL at 39-40 wk discussed. Pt in agreement. Message sent to  for cytotec induction      Overweight (pre-preg BMI 28) - upper limits of weight gain  -wt gain goal 15-25 lb. Up 25 lb   -Cautioned regarding increased risk of fetal macrosomia, birth injury for fetus and mother, need for  section. Rubella non immune  -MMR postpartum discussed     Unable to determine fetal presentation  -42/19 US - Cephalic.  VINCENT 12.3 cm     RTC 1 wk
What Type Of Note Output Would You Prefer (Optional)?: Standard Output
What Is The Reason For Today's Visit?: Full Body Skin Examination
What Is The Reason For Today's Visit? (Being Monitored For X): concerning skin lesions on an annual basis

## 2023-11-01 ENCOUNTER — TELEPHONE (OUTPATIENT)
Facility: CLINIC | Age: 28
End: 2023-11-01

## 2023-11-06 ENCOUNTER — ROUTINE PRENATAL (OUTPATIENT)
Dept: OBGYN CLINIC | Facility: CLINIC | Age: 28
End: 2023-11-06
Payer: COMMERCIAL

## 2023-11-06 VITALS
HEIGHT: 60 IN | SYSTOLIC BLOOD PRESSURE: 145 MMHG | BODY MASS INDEX: 33.33 KG/M2 | HEART RATE: 67 BPM | DIASTOLIC BLOOD PRESSURE: 85 MMHG | WEIGHT: 169.75 LBS

## 2023-11-06 DIAGNOSIS — Z34.03 PREGNANCY, FIRST, THIRD TRIMESTER: Primary | ICD-10-CM

## 2023-11-06 NOTE — PROGRESS NOTES
HAYDEN 38w5d    She is doing well, no complaints. Labor precautions discussed. SVE today fingertip/70/-3. IOL scheduled for  Cytotec and pitocin . LESLEE: 11/15/23 by LMP c/w 8w6d US  A+   -cfDNA neg, GIRL   -carrier screen - declines   -Anatomy US normal   -classes, pre-registration, pediatrician, breast pump, car-seat discussed   -vitamin K & erythromycin eye ointment discussed.   -Tdap & Flu done       Overweight (pre-preg BMI 28) - upper limits of weight gain  -wt gain goal 15-25 lb. Up 25 lb   -Cautioned regarding increased risk of fetal macrosomia, birth injury for fetus and mother, need for  section at prior visit. Rubella non immune  -MMR postpartum discussed      Unable to determine fetal presentation  -/ US - Cephalic.  VINCENT 12.3 cm

## 2023-11-11 ENCOUNTER — TELEPHONE (OUTPATIENT)
Facility: CLINIC | Age: 28
End: 2023-11-11

## 2023-11-12 NOTE — TELEPHONE ENCOUNTER
On call physician    32year old  at 39w3d     OB Hx: Overweight (pre-preg BMI 28), rubella non immune    Paging on call provider c/o had a spell this evening that she thought could be low BP. Was walking around her house  Felt kind of mental fog. Didn't remember sending some texts  Slightly shaky  Cold, clammy hand. Sat down for about 20 min to rest.   Ate 2 dates around 35 min ago. Last meal was at 12:30 at lunch today. No current contractions, leaking, bleeding. Last night around 0130 was having a menstrual like cramping for 2-3 hours. Just discomfort, not painful. +FM. Reviewed sounds like hypoglycemia.    Encouraged her to eat more frequently as it had been probably about 6+ hours since her last real meal.     Patient expressed understanding    Windy Tanner MD

## 2023-11-14 ENCOUNTER — HOSPITAL ENCOUNTER (INPATIENT)
Facility: HOSPITAL | Age: 28
LOS: 3 days | Discharge: HOME | End: 2023-11-17
Attending: OBSTETRICS & GYNECOLOGY | Admitting: OBSTETRICS & GYNECOLOGY
Payer: COMMERCIAL

## 2023-11-14 ENCOUNTER — ROUTINE PRENATAL (OUTPATIENT)
Facility: CLINIC | Age: 28
End: 2023-11-14
Payer: COMMERCIAL

## 2023-11-14 VITALS
HEIGHT: 60 IN | HEART RATE: 80 BPM | SYSTOLIC BLOOD PRESSURE: 138 MMHG | DIASTOLIC BLOOD PRESSURE: 88 MMHG | BODY MASS INDEX: 33.3 KG/M2 | WEIGHT: 169.63 LBS

## 2023-11-14 DIAGNOSIS — O09.899 RUBELLA NON-IMMUNE STATUS, ANTEPARTUM: ICD-10-CM

## 2023-11-14 DIAGNOSIS — Z28.39 RUBELLA NON-IMMUNE STATUS, ANTEPARTUM: ICD-10-CM

## 2023-11-14 DIAGNOSIS — O26.03 EXCESSIVE WEIGHT GAIN DURING PREGNANCY IN THIRD TRIMESTER: ICD-10-CM

## 2023-11-14 DIAGNOSIS — O16.3 ELEVATED BLOOD PRESSURE AFFECTING PREGNANCY IN THIRD TRIMESTER, ANTEPARTUM: Primary | ICD-10-CM

## 2023-11-14 DIAGNOSIS — Z34.03 PREGNANCY, FIRST, THIRD TRIMESTER: ICD-10-CM

## 2023-11-14 DIAGNOSIS — E66.3 OVERWEIGHT (BMI 25.0-29.9): ICD-10-CM

## 2023-11-14 PROBLEM — Z34.90 PREGNANCY: Status: ACTIVE | Noted: 2023-11-14

## 2023-11-14 PROBLEM — Z34.90 PREGNANCY (HCC): Status: ACTIVE | Noted: 2023-11-14

## 2023-11-14 LAB
ALBUMIN SERPL-MCNC: 3.4 G/DL (ref 3.4–5)
ALBUMIN/GLOB SERPL: 0.8 {RATIO} (ref 1–2)
ALP LIVER SERPL-CCNC: 131 U/L
ALT SERPL-CCNC: 13 U/L
ANION GAP SERPL CALC-SCNC: 9 MMOL/L (ref 0–18)
ANTIBODY SCREEN: NEGATIVE
AST SERPL-CCNC: 18 U/L (ref 15–37)
BASOPHILS # BLD AUTO: 0.05 X10(3) UL (ref 0–0.2)
BASOPHILS NFR BLD AUTO: 0.5 %
BILIRUB SERPL-MCNC: 0.8 MG/DL (ref 0.1–2)
BUN BLD-MCNC: 15 MG/DL (ref 9–23)
CALCIUM BLD-MCNC: 9.4 MG/DL (ref 8.5–10.1)
CHLORIDE SERPL-SCNC: 105 MMOL/L (ref 98–112)
CO2 SERPL-SCNC: 20 MMOL/L (ref 21–32)
CREAT BLD-MCNC: 0.83 MG/DL
CREAT UR-SCNC: 66.7 MG/DL
EGFRCR SERPLBLD CKD-EPI 2021: 98 ML/MIN/1.73M2 (ref 60–?)
EOSINOPHIL # BLD AUTO: 0.06 X10(3) UL (ref 0–0.7)
EOSINOPHIL NFR BLD AUTO: 0.6 %
ERYTHROCYTE [DISTWIDTH] IN BLOOD BY AUTOMATED COUNT: 11.9 %
FASTING STATUS PATIENT QL REPORTED: NO
GLOBULIN PLAS-MCNC: 4.5 G/DL (ref 2.8–4.4)
GLUCOSE BLD-MCNC: 72 MG/DL (ref 70–99)
HCT VFR BLD AUTO: 37.7 %
HGB BLD-MCNC: 13.7 G/DL
HIV 1+2 AB+HIV1 P24 AG SERPL QL IA: NONREACTIVE
IMM GRANULOCYTES # BLD AUTO: 0.07 X10(3) UL (ref 0–1)
IMM GRANULOCYTES NFR BLD: 0.6 %
LYMPHOCYTES # BLD AUTO: 2.36 X10(3) UL (ref 1–4)
LYMPHOCYTES NFR BLD AUTO: 21.7 %
MCH RBC QN AUTO: 31.5 PG (ref 26–34)
MCHC RBC AUTO-ENTMCNC: 36.3 G/DL (ref 31–37)
MCV RBC AUTO: 86.7 FL
MONOCYTES # BLD AUTO: 0.49 X10(3) UL (ref 0.1–1)
MONOCYTES NFR BLD AUTO: 4.5 %
NEUTROPHILS # BLD AUTO: 7.85 X10 (3) UL (ref 1.5–7.7)
NEUTROPHILS # BLD AUTO: 7.85 X10(3) UL (ref 1.5–7.7)
NEUTROPHILS NFR BLD AUTO: 72.1 %
OSMOLALITY SERPL CALC.SUM OF ELEC: 277 MOSM/KG (ref 275–295)
PLATELET # BLD AUTO: 270 10(3)UL (ref 150–450)
POTASSIUM SERPL-SCNC: 3.8 MMOL/L (ref 3.5–5.1)
PROT SERPL-MCNC: 7.9 G/DL (ref 6.4–8.2)
PROT UR-MCNC: 22 MG/DL
PROT/CREAT UR-RTO: 0.33
RBC # BLD AUTO: 4.35 X10(6)UL
RH BLOOD TYPE: POSITIVE
SODIUM SERPL-SCNC: 134 MMOL/L (ref 136–145)
T PALLIDUM AB SER QL IA: NONREACTIVE
URATE SERPL-MCNC: 6.6 MG/DL
WBC # BLD AUTO: 10.9 X10(3) UL (ref 4–11)

## 2023-11-14 PROCEDURE — 3075F SYST BP GE 130 - 139MM HG: CPT | Performed by: OBSTETRICS & GYNECOLOGY

## 2023-11-14 PROCEDURE — 59025 FETAL NON-STRESS TEST: CPT | Performed by: OBSTETRICS & GYNECOLOGY

## 2023-11-14 PROCEDURE — 3E0P7VZ INTRODUCTION OF HORMONE INTO FEMALE REPRODUCTIVE, VIA NATURAL OR ARTIFICIAL OPENING: ICD-10-PCS | Performed by: OBSTETRICS & GYNECOLOGY

## 2023-11-14 PROCEDURE — 3E033VJ INTRODUCTION OF OTHER HORMONE INTO PERIPHERAL VEIN, PERCUTANEOUS APPROACH: ICD-10-PCS | Performed by: OBSTETRICS & GYNECOLOGY

## 2023-11-14 PROCEDURE — 3008F BODY MASS INDEX DOCD: CPT | Performed by: OBSTETRICS & GYNECOLOGY

## 2023-11-14 PROCEDURE — 3079F DIAST BP 80-89 MM HG: CPT | Performed by: OBSTETRICS & GYNECOLOGY

## 2023-11-14 RX ORDER — ACETAMINOPHEN 500 MG
500 TABLET ORAL EVERY 6 HOURS PRN
Status: DISCONTINUED | OUTPATIENT
Start: 2023-11-14 | End: 2023-11-15

## 2023-11-14 RX ORDER — IBUPROFEN 600 MG/1
600 TABLET ORAL ONCE AS NEEDED
Status: DISCONTINUED | OUTPATIENT
Start: 2023-11-14 | End: 2023-11-15

## 2023-11-14 RX ORDER — ONDANSETRON 2 MG/ML
4 INJECTION INTRAMUSCULAR; INTRAVENOUS EVERY 6 HOURS PRN
Status: DISCONTINUED | OUTPATIENT
Start: 2023-11-14 | End: 2023-11-15

## 2023-11-14 RX ORDER — HYDROMORPHONE HYDROCHLORIDE 1 MG/ML
1 INJECTION, SOLUTION INTRAMUSCULAR; INTRAVENOUS; SUBCUTANEOUS EVERY 2 HOUR PRN
Status: COMPLETED | OUTPATIENT
Start: 2023-11-14 | End: 2023-11-15

## 2023-11-14 RX ORDER — TERBUTALINE SULFATE 1 MG/ML
0.25 INJECTION, SOLUTION SUBCUTANEOUS AS NEEDED
Status: DISCONTINUED | OUTPATIENT
Start: 2023-11-14 | End: 2023-11-15

## 2023-11-14 RX ORDER — CITRIC ACID/SODIUM CITRATE 334-500MG
30 SOLUTION, ORAL ORAL AS NEEDED
Status: DISCONTINUED | OUTPATIENT
Start: 2023-11-14 | End: 2023-11-15

## 2023-11-14 RX ORDER — BUPIVACAINE HCL/0.9 % NACL/PF 0.25 %
5 PLASTIC BAG, INJECTION (ML) EPIDURAL AS NEEDED
Status: DISCONTINUED | OUTPATIENT
Start: 2023-11-14 | End: 2023-11-15

## 2023-11-14 RX ORDER — ACETAMINOPHEN 500 MG
1000 TABLET ORAL EVERY 6 HOURS PRN
Status: DISCONTINUED | OUTPATIENT
Start: 2023-11-14 | End: 2023-11-15

## 2023-11-14 RX ORDER — SODIUM CHLORIDE, SODIUM LACTATE, POTASSIUM CHLORIDE, CALCIUM CHLORIDE 600; 310; 30; 20 MG/100ML; MG/100ML; MG/100ML; MG/100ML
INJECTION, SOLUTION INTRAVENOUS CONTINUOUS
Status: DISCONTINUED | OUTPATIENT
Start: 2023-11-14 | End: 2023-11-15

## 2023-11-14 RX ORDER — NALBUPHINE HYDROCHLORIDE 10 MG/ML
2.5 INJECTION, SOLUTION INTRAMUSCULAR; INTRAVENOUS; SUBCUTANEOUS
Status: DISCONTINUED | OUTPATIENT
Start: 2023-11-14 | End: 2023-11-15

## 2023-11-14 RX ORDER — DEXTROSE, SODIUM CHLORIDE, SODIUM LACTATE, POTASSIUM CHLORIDE, AND CALCIUM CHLORIDE 5; .6; .31; .03; .02 G/100ML; G/100ML; G/100ML; G/100ML; G/100ML
INJECTION, SOLUTION INTRAVENOUS AS NEEDED
Status: DISCONTINUED | OUTPATIENT
Start: 2023-11-14 | End: 2023-11-15

## 2023-11-14 NOTE — PROGRESS NOTES
Ramesh Mary [id number]    Partner here   +FM. Mild tightenings. No leaking fluid or vaginal bleeding. No headaches. No vision changes. No epigastric pain. Vitals:    23 1503   BP: 138/88   Pulse: 80   Weight: 169 lb 9.6 oz (76.9 kg)   Height: 61"     32year old  at 37w11d   LESLEE: 11/15/23 by LMP c/w 8w6d US  A+/GBS neg     -cfDNA neg, GIRL   -carrier screen - declines   -Anatomy US normal   -classes, pre-registration, pediatrician, breast pump, car-seat discussed   -vitamin K & erythromycin eye ointment discussed.   -Tdap & Flu done  -New  COVID booster encouraged   -Cervix 1+/14%/-8 cephalic  at 8:32 PM. +Some malodor. ?BV vs leaking fluid? IOL at 39-40 wk discussed. Scheduled 23 for misoprostol at 40+ weeks but will send to L&D now. Elevated blood pressure  -23 /88 -> repeat 145/85. No labs were ordered  -23 /88 at 39w6d. No symptoms of pre-eclampsia. Advise she go to L&D now for labs, serial BP, probable induction of labor. Will be postdates  -NST today, 2023 at 39w6d - reactive NST      Overweight (pre-preg BMI 28) - upper limits of weight gain  -wt gain goal 15-25 lb. Up 24 lb   -Cautioned regarding increased risk of fetal macrosomia, birth injury for fetus and mother, need for  section. Rubella non immune  -MMR postpartum discussed     Unable to determine fetal presentation  - US - Cephalic. VINCENT 12.3 cm     To L&D for labs, serial BP, probable induction of labor.  Discussed with L&D charge RN

## 2023-11-15 ENCOUNTER — ANESTHESIA EVENT (OUTPATIENT)
Dept: OBGYN UNIT | Facility: HOSPITAL | Age: 28
End: 2023-11-15
Payer: COMMERCIAL

## 2023-11-15 ENCOUNTER — ANESTHESIA (OUTPATIENT)
Dept: OBGYN UNIT | Facility: HOSPITAL | Age: 28
End: 2023-11-15
Payer: COMMERCIAL

## 2023-11-15 PROCEDURE — 0DQR0ZZ REPAIR ANAL SPHINCTER, OPEN APPROACH: ICD-10-PCS | Performed by: OBSTETRICS & GYNECOLOGY

## 2023-11-15 PROCEDURE — 59400 OBSTETRICAL CARE: CPT | Performed by: OBSTETRICS & GYNECOLOGY

## 2023-11-15 PROCEDURE — 10907ZC DRAINAGE OF AMNIOTIC FLUID, THERAPEUTIC FROM PRODUCTS OF CONCEPTION, VIA NATURAL OR ARTIFICIAL OPENING: ICD-10-PCS | Performed by: OBSTETRICS & GYNECOLOGY

## 2023-11-15 RX ORDER — DOCUSATE SODIUM 100 MG/1
100 CAPSULE, LIQUID FILLED ORAL
Status: DISCONTINUED | OUTPATIENT
Start: 2023-11-15 | End: 2023-11-17

## 2023-11-15 RX ORDER — ACETAMINOPHEN 500 MG
1000 TABLET ORAL EVERY 6 HOURS PRN
Status: DISCONTINUED | OUTPATIENT
Start: 2023-11-15 | End: 2023-11-17

## 2023-11-15 RX ORDER — BISACODYL 10 MG
10 SUPPOSITORY, RECTAL RECTAL ONCE AS NEEDED
Status: DISCONTINUED | OUTPATIENT
Start: 2023-11-15 | End: 2023-11-17

## 2023-11-15 RX ORDER — IBUPROFEN 600 MG/1
600 TABLET ORAL EVERY 6 HOURS
Status: DISCONTINUED | OUTPATIENT
Start: 2023-11-15 | End: 2023-11-17

## 2023-11-15 RX ORDER — TRANEXAMIC ACID 10 MG/ML
INJECTION, SOLUTION INTRAVENOUS
Status: COMPLETED
Start: 2023-11-15 | End: 2023-11-15

## 2023-11-15 RX ORDER — BUPIVACAINE HYDROCHLORIDE 2.5 MG/ML
INJECTION, SOLUTION EPIDURAL; INFILTRATION; INTRACAUDAL AS NEEDED
Status: DISCONTINUED | OUTPATIENT
Start: 2023-11-15 | End: 2023-11-15 | Stop reason: SURG

## 2023-11-15 RX ORDER — LIDOCAINE HYDROCHLORIDE AND EPINEPHRINE 15; 5 MG/ML; UG/ML
INJECTION, SOLUTION EPIDURAL AS NEEDED
Status: DISCONTINUED | OUTPATIENT
Start: 2023-11-15 | End: 2023-11-15 | Stop reason: SURG

## 2023-11-15 RX ORDER — ACETAMINOPHEN 500 MG
500 TABLET ORAL EVERY 6 HOURS PRN
Status: DISCONTINUED | OUTPATIENT
Start: 2023-11-15 | End: 2023-11-17

## 2023-11-15 RX ORDER — HYDROMORPHONE HYDROCHLORIDE 1 MG/ML
INJECTION, SOLUTION INTRAMUSCULAR; INTRAVENOUS; SUBCUTANEOUS
Status: DISPENSED
Start: 2023-11-15 | End: 2023-11-16

## 2023-11-15 RX ORDER — METHYLERGONOVINE MALEATE 0.2 MG/ML
INJECTION INTRAVENOUS
Status: COMPLETED
Start: 2023-11-15 | End: 2023-11-15

## 2023-11-15 RX ORDER — SIMETHICONE 80 MG
80 TABLET,CHEWABLE ORAL 3 TIMES DAILY PRN
Status: DISCONTINUED | OUTPATIENT
Start: 2023-11-15 | End: 2023-11-17

## 2023-11-15 RX ADMIN — BUPIVACAINE HYDROCHLORIDE 5 ML: 2.5 INJECTION, SOLUTION EPIDURAL; INFILTRATION; INTRACAUDAL at 04:50:00

## 2023-11-15 RX ADMIN — LIDOCAINE HYDROCHLORIDE AND EPINEPHRINE 3 ML: 15; 5 INJECTION, SOLUTION EPIDURAL at 04:49:00

## 2023-11-15 NOTE — PLAN OF CARE
Problem: Patient/Family Goals  Goal: Patient/Family Long Term Goal  Description: Uncomplicated vaginal delivery    Interventions:  VS per protocol  I&O  Ice chips and sips as tolerated  EFM per protocol  Maintain IV as ordered  Antibiotics as needed per protocol  Informed consent  Patient's Long Term Goal:     Interventions:  - See additional Care Plan goals for specific interventions  Outcome: Completed  Goal: Patient/Family Short Term Goal  Description: Adequate pain control with delivery of infant  Interventions:  Pain assessment scores as ordered  Patient scores pain a \"3\" or less  Multidisciplinary care   Nonpharmacologic comfort measures    Patient's Short Term Goal:     Interventions:     - See additional Care Plan goals for specific interventions  Outcome: Completed     Problem: BIRTH - VAGINAL/ SECTION  Goal: Fetal and maternal status remain reassuring during the birth process  Description: INTERVENTIONS:  - Monitor vital signs  - Monitor fetal heart rate  - Monitor uterine activity  - Monitor labor progression (vaginal delivery)  - DVT prophylaxis (C/S delivery)  - Surgical antibiotic prophylaxis (C/S delivery)  Outcome: Completed     Problem: PAIN - ADULT  Goal: Verbalizes/displays adequate comfort level or patient's stated pain goal  Description: INTERVENTIONS:  - Encourage pt to monitor pain and request assistance  - Assess pain using appropriate pain scale  - Administer analgesics based on type and severity of pain and evaluate response  - Implement non-pharmacological measures as appropriate and evaluate response  - Consider cultural and social influences on pain and pain management  - Manage/alleviate anxiety  - Utilize distraction and/or relaxation techniques  - Monitor for opioid side effects  - Notify MD/LIP if interventions unsuccessful or patient reports new pain  - Anticipate increased pain with activity and pre-medicate as appropriate  Outcome: Progressing     Problem: ANXIETY  Goal: Will report anxiety at manageable levels  Description: INTERVENTIONS:  - Administer medication as ordered  - Teach and rehearse alternative coping skills  - Provide emotional support with 1:1 interaction with staff  Outcome: Progressing     Problem: SAFETY ADULT - FALL  Goal: Free from fall injury  Description: INTERVENTIONS:  - Assess pt frequently for physical needs  - Identify cognitive and physical deficits and behaviors that affect risk of falls.   - [place] fall precautions as indicated by assessment.  - Educate pt/family on patient safety including physical limitations  - Instruct pt to call for assistance with activity based on assessment  - Modify environment to reduce risk of injury  - Provide assistive devices as appropriate  - Consider OT/PT consult to assist with strengthening/mobility  - Encourage toileting schedule  Outcome: Progressing

## 2023-11-15 NOTE — L&D DELIVERY NOTE
Jadon, Girl [[id number]]      Labor Events     labor?: No   steroids?: None  Antibiotics received during labor?: No  Rupture date/time: 11/15/2023 0905     Rupture type: AROM  Fluid color: Clear  Labor type: Induced Onset of Labor  Induction: Misoprostol  Indications for induction: Preeclampsia  Intrapartum & labor complications:  Other Excessive Bleeding       Labor Length    1st stage: 12h 08m  2nd stage: 1h 22m  3rd stage: 0h 03m       Labor Event Times    Labor onset date/time: 2023 2300  Dilation complete date/time: 11/15/2023 1108  Start pushing date/time: 11/15/2023 1116        Presentation    Presentation: Vertex  Position: Occiput Anterior       Operative Delivery    Operative Vaginal Delivery: No                      Shoulder Dystocia    Shoulder Dystocia: No             Anesthesia    Method: Epidural              [place] Delivery      Delivery date/time:  11/15/23 12:30:32   Delivery type: Normal spontaneous vaginal delivery    Details:     Delivery location: delivery room       Delivery Providers    Delivering Clinician: Andrew Hernandez DO   Delivery personnel:  Provider Role   Sen Cheung RN Baby Nurse   David Bautista RN Delivery Nurse             Cord    Vessels: 3 Vessels  Complications: None  # of loops: 0  Timed cord clamping: Yes  Time in sec: 30  Cord blood disposition: to lab  Gases sent?: No       Resuscitation    Method: None       [place] Measurements      Weight: 3350 g 7 lb 6.2 oz Length: 49.5 cm     Head circum.: 34.5 cm Chest circum.: 33 cm          Abdominal circum.: 30.5 cm           Placenta    Date/time: 11/15/2023 1234  Removal: Spontaneous  Appearance: Intact  Disposition: held for future pathology       Apgars    Living status: Living   Apgar Scoring Key:    0 1 2    Skin color Blue or pale Acrocyanotic Completely pink    Heart rate Absent <100 bpm >100 bpm    Reflex irritability No response Grimace Cry or active withdrawal    Muscle tone Limp Some flexion Active motion    Respiratory effort Absent Weak cry; hypoventilation Good, crying              1 Minute:  5 Minute:  10 Minute:  15 Minute:  20 Minute:      Skin color: 0  1       Heart rate: 2  2       Reflex irritablity: 2  2       Muscle tone: 2  2       Respiratory effort: 2  2       Total: 8  9          Apgars assigned by: Kristen Bianchi RN   disposition: with mother       Skin to Skin    Reason skin to skin not initiated: Maternal Acuity       Vaginal Count    Initial count RN: Bandar Zambrano RN  Initial count Tech: Byriene, Anastasiya   Sponges   Sharps    Initial counts 11   0    Final counts 41   4    Final count RN: Geri Berrios RN  Final count MD: Nelia Ahuja,        Delivery (Maternal)    Episiotomy: None  Perineal lacerations: 3rd Repaired?: Yes     Vaginal laceration?: Yes Repaired?: Yes     Cervical laceration?: No    Clitoral laceration?: No    Quantitative blood loss (mL): 1005              32 y.o.  at 36 weeks with  female infant , nuchal cord x1 delivered through. Body and shoulders easily delivered, bulb suctioned at perineum. Cord clamped x2 and cut after 30 seconds. Placenta delivered spontaneously, intact. Vaginal sulcus laceration and 3rd degree perineal laceration repaired with 2-0 chromic. QBL 1005, TXA given, METHEGEN given IM, vaginal packing placed.

## 2023-11-15 NOTE — ANESTHESIA PROCEDURE NOTES
Labor Analgesia    Date/Time: 11/15/2023 4:40 AM    Performed by: Jasmina Moreno MD  Authorized by: Jasmina Moreno MD      General Information and Staff    Start Time:  11/15/2023 4:40 AM  End Time:  11/15/2023 4:49 AM  Anesthesiologist:  Jasmina Moreno MD  Performed by:   Anesthesiologist  Patient Location:  OB  Site Identification: surface landmarks    Reason for Block: labor epidural    Preanesthetic Checklist: patient identified, IV checked, risks and benefits discussed, monitors and equipment checked, pre-op evaluation, timeout performed, IV bolus, anesthesia consent and sterile technique used      Procedure Details    Patient Position:  Sitting  Prep: ChloraPrep    Monitoring:  Heart rate and continuous pulse ox  Approach:  Midline    Epidural Needle    Injection Technique:  BERNADETTE saline  Needle Type:  Tuohy  Needle Gauge:  17 G  Needle Length:  3.375 in  Needle Insertion Depth:  6    Spinal Needle      Catheter    Catheter Type:  End hole  Catheter Size:  19 G  Catheter at Skin Depth:  11  Test Dose:  Negative    Assessment    Sensory Level:  T10    Additional Comments

## 2023-11-15 NOTE — PLAN OF CARE
Problem: Patient/Family Goals  Goal: Patient/Family Long Term Goal  Description: Uncomplicated vaginal delivery    Interventions:  VS per protocol  I&O  Ice chips and sips as tolerated  EFM per protocol  Maintain IV as ordered  Antibiotics as needed per protocol  Informed consent  Patient's Long Term Goal:     Interventions:  - See additional Care Plan goals for specific interventions  Outcome: Progressing  Goal: Patient/Family Short Term Goal  Description: Adequate pain control with delivery of infant  Interventions:  Pain assessment scores as ordered  Patient scores pain a \"3\" or less  Multidisciplinary care   Nonpharmacologic comfort measures    Patient's Short Term Goal:     Interventions:     - See additional Care Plan goals for specific interventions  Outcome: Progressing     Problem: BIRTH - VAGINAL/ SECTION  Goal: Fetal and maternal status remain reassuring during the birth process  Description: INTERVENTIONS:  - Monitor vital signs  - Monitor fetal heart rate  - Monitor uterine activity  - Monitor labor progression (vaginal delivery)  - DVT prophylaxis (C/S delivery)  - Surgical antibiotic prophylaxis (C/S delivery)  Outcome: Progressing     Problem: PAIN - ADULT  Goal: Verbalizes/displays adequate comfort level or patient's stated pain goal  Description: INTERVENTIONS:  - Encourage pt to monitor pain and request assistance  - Assess pain using appropriate pain scale  - Administer analgesics based on type and severity of pain and evaluate response  - Implement non-pharmacological measures as appropriate and evaluate response  - Consider cultural and social influences on pain and pain management  - Manage/alleviate anxiety  - Utilize distraction and/or relaxation techniques  - Monitor for opioid side effects  - Notify MD/LIP if interventions unsuccessful or patient reports new pain  - Anticipate increased pain with activity and pre-medicate as appropriate  Outcome: Progressing     Problem: ANXIETY  Goal: Will report anxiety at manageable levels  Description: INTERVENTIONS:  - Administer medication as ordered  - Teach and rehearse alternative coping skills  - Provide emotional support with 1:1 interaction with staff  Outcome: Progressing     Problem: SAFETY ADULT - FALL  Goal: Free from fall injury  Description: INTERVENTIONS:  - Assess pt frequently for physical needs  - Identify cognitive and physical deficits and behaviors that affect risk of falls.   - [place] fall precautions as indicated by assessment.  - Educate pt/family on patient safety including physical limitations  - Instruct pt to call for assistance with activity based on assessment  - Modify environment to reduce risk of injury  - Provide assistive devices as appropriate  - Consider OT/PT consult to assist with strengthening/mobility  - Encourage toileting schedule  Outcome: Progressing

## 2023-11-15 NOTE — PROGRESS NOTES
Patient and infant transferred to mb in stable condition. Packing still in place. Report given to [facility].  Bands matched and verified with rn and parents

## 2023-11-15 NOTE — PLAN OF CARE
Problem: Patient/Family Goals  Goal: Patient/Family Long Term Goal  Description: Uncomplicated vaginal delivery    Interventions:  VS per protocol  I&O  Ice chips and sips as tolerated  EFM per protocol  Maintain IV as ordered  Antibiotics as needed per protocol  Informed consent  Patient's Long Term Goal:     Interventions:  - See additional Care Plan goals for specific interventions  Outcome: Progressing  Goal: Patient/Family Short Term Goal  Description: Adequate pain control with delivery of infant  Interventions:  Pain assessment scores as ordered  Patient scores pain a \"3\" or less  Multidisciplinary care   Nonpharmacologic comfort measures    Patient's Short Term Goal:     Interventions:     - See additional Care Plan goals for specific interventions  Outcome: Progressing     Problem: BIRTH - VAGINAL/ SECTION  Goal: Fetal and maternal status remain reassuring during the birth process  Description: INTERVENTIONS:  - Monitor vital signs  - Monitor fetal heart rate  - Monitor uterine activity  - Monitor labor progression (vaginal delivery)  - DVT prophylaxis (C/S delivery)  - Surgical antibiotic prophylaxis (C/S delivery)  Outcome: Progressing     Problem: PAIN - ADULT  Goal: Verbalizes/displays adequate comfort level or patient's stated pain goal  Description: INTERVENTIONS:  - Encourage pt to monitor pain and request assistance  - Assess pain using appropriate pain scale  - Administer analgesics based on type and severity of pain and evaluate response  - Implement non-pharmacological measures as appropriate and evaluate response  - Consider cultural and social influences on pain and pain management  - Manage/alleviate anxiety  - Utilize distraction and/or relaxation techniques  - Monitor for opioid side effects  - Notify MD/LIP if interventions unsuccessful or patient reports new pain  - Anticipate increased pain with activity and pre-medicate as appropriate  Outcome: Progressing     Problem: ANXIETY  Goal: Will report anxiety at manageable levels  Description: INTERVENTIONS:  - Administer medication as ordered  - Teach and rehearse alternative coping skills  - Provide emotional support with 1:1 interaction with staff  Outcome: Progressing

## 2023-11-15 NOTE — PROGRESS NOTES
NURSING ADMISSION NOTE      Patient admitted via Wheelchair  Oriented to room. Safety precautions initiated. Bed in low position. Call light in reach.   Received patient, AOx4, VSS, with vaginal packing in place, IV heplocked, in bed, comfortable, postpartum  and  care reviewed and plan of care in place, will continue to assist as needed

## 2023-11-16 LAB
BASOPHILS # BLD AUTO: 0.06 X10(3) UL (ref 0–0.2)
BASOPHILS NFR BLD AUTO: 0.4 %
EOSINOPHIL # BLD AUTO: 0.07 X10(3) UL (ref 0–0.7)
EOSINOPHIL NFR BLD AUTO: 0.5 %
ERYTHROCYTE [DISTWIDTH] IN BLOOD BY AUTOMATED COUNT: 12.1 %
HCT VFR BLD AUTO: 28.9 %
HGB BLD-MCNC: 9.9 G/DL
IMM GRANULOCYTES # BLD AUTO: 0.1 X10(3) UL (ref 0–1)
IMM GRANULOCYTES NFR BLD: 0.7 %
LYMPHOCYTES # BLD AUTO: 1.82 X10(3) UL (ref 1–4)
LYMPHOCYTES NFR BLD AUTO: 11.8 %
MCH RBC QN AUTO: 31.1 PG (ref 26–34)
MCHC RBC AUTO-ENTMCNC: 34.3 G/DL (ref 31–37)
MCV RBC AUTO: 90.9 FL
MONOCYTES # BLD AUTO: 0.65 X10(3) UL (ref 0.1–1)
MONOCYTES NFR BLD AUTO: 4.2 %
NEUTROPHILS # BLD AUTO: 12.68 X10 (3) UL (ref 1.5–7.7)
NEUTROPHILS # BLD AUTO: 12.68 X10(3) UL (ref 1.5–7.7)
NEUTROPHILS NFR BLD AUTO: 82.4 %
PLATELET # BLD AUTO: 191 10(3)UL (ref 150–450)
RBC # BLD AUTO: 3.18 X10(6)UL
WBC # BLD AUTO: 15.4 X10(3) UL (ref 4–11)

## 2023-11-16 RX ORDER — PSEUDOEPHEDRINE HCL 30 MG
100 TABLET ORAL 2 TIMES DAILY PRN
Qty: 30 CAPSULE | Refills: 0 | Status: SHIPPED | OUTPATIENT
Start: 2023-11-16

## 2023-11-16 RX ORDER — MELATONIN
325
Status: DISCONTINUED | OUTPATIENT
Start: 2023-11-17 | End: 2023-11-17

## 2023-11-16 RX ORDER — MELATONIN
325
Qty: 30 TABLET | Refills: 0 | Status: SHIPPED | OUTPATIENT
Start: 2023-11-17

## 2023-11-16 RX ORDER — IBUPROFEN 600 MG/1
600 TABLET ORAL EVERY 6 HOURS
Qty: 60 TABLET | Refills: 0 | Status: SHIPPED | OUTPATIENT
Start: 2023-11-17

## 2023-11-16 NOTE — PROGRESS NOTES
Dr. Timo Orona called, updated that patient only has minimal bleeding, will continue to monitor, ice pack applied to perineal/vaginal area, tolerated by patient

## 2023-11-17 VITALS
BODY MASS INDEX: 33.18 KG/M2 | HEART RATE: 69 BPM | SYSTOLIC BLOOD PRESSURE: 117 MMHG | WEIGHT: 169 LBS | TEMPERATURE: 98 F | OXYGEN SATURATION: 96 % | RESPIRATION RATE: 16 BRPM | DIASTOLIC BLOOD PRESSURE: 65 MMHG | HEIGHT: 60 IN

## 2023-11-17 NOTE — PROGRESS NOTES
Patient up and about, AOx4, VSS, seen by OB , ok to go home, postpartum care and  care reviewed and already has a follow up appointment, patient verbalized understanding of instructions, and spouse at bedside very supportive

## 2023-11-17 NOTE — PROGRESS NOTES
Patient signed paper, hugs and kisses off, in wheelchair, going home with spouse and her baby in car seat, with their belongings

## 2023-11-19 ENCOUNTER — TELEPHONE (OUTPATIENT)
Dept: OBGYN UNIT | Facility: HOSPITAL | Age: 28
End: 2023-11-19

## 2023-11-19 NOTE — PROGRESS NOTES
Reviewed self and infant care w / mom, she verbalizes understanding of instructions reviewed. Encourage to follow up w/ MDs as directed and w/ questions/concerns. Enc to join ct.  Had a few higher bps before del, but none after, sx of PIH reviewed

## 2023-11-28 ENCOUNTER — TELEPHONE (OUTPATIENT)
Facility: CLINIC | Age: 28
End: 2023-11-28

## 2023-11-28 NOTE — TELEPHONE ENCOUNTER
Pt called requesting a note through [employer] for her HR department stating the date she delivered and the type of delivery she had. Pt asked if a nurse has any questions to please call her.

## 2023-12-21 ENCOUNTER — APPOINTMENT (OUTPATIENT)
Dept: URBAN - METROPOLITAN AREA CLINIC 242 | Age: 28
Setting detail: DERMATOLOGY
End: 2023-12-21

## 2023-12-21 DIAGNOSIS — L20.89 OTHER ATOPIC DERMATITIS: ICD-10-CM

## 2023-12-21 PROCEDURE — 99203 OFFICE O/P NEW LOW 30 MIN: CPT

## 2023-12-21 PROCEDURE — OTHER PRESCRIPTION MEDICATION MANAGEMENT: OTHER

## 2023-12-21 PROCEDURE — OTHER COUNSELING: OTHER

## 2023-12-21 PROCEDURE — OTHER PRESCRIPTION: OTHER

## 2023-12-21 RX ORDER — CLOBETASOL PROPIONATE 0.5 MG/ML
SOLUTION TOPICAL
Qty: 25 | Refills: 2 | Status: ERX | COMMUNITY
Start: 2023-12-21

## 2023-12-21 ASSESSMENT — LOCATION ZONE DERM: LOCATION ZONE: NECK

## 2023-12-21 ASSESSMENT — LOCATION DETAILED DESCRIPTION DERM
LOCATION DETAILED: LEFT MEDIAL TRAPEZIAL NECK
LOCATION DETAILED: RIGHT INFERIOR POSTERIOR NECK

## 2023-12-21 ASSESSMENT — LOCATION SIMPLE DESCRIPTION DERM: LOCATION SIMPLE: POSTERIOR NECK

## 2023-12-21 NOTE — PROCEDURE: PRESCRIPTION MEDICATION MANAGEMENT
Initiate Treatment: clobetasol 0.05 % scalp solution \\nSig: Apply to scalp BID for 2 weeks
Render In Strict Bullet Format?: No
Detail Level: Zone

## 2023-12-29 ENCOUNTER — POSTPARTUM (OUTPATIENT)
Dept: OBGYN CLINIC | Facility: CLINIC | Age: 28
End: 2023-12-29
Payer: COMMERCIAL

## 2023-12-29 VITALS
DIASTOLIC BLOOD PRESSURE: 79 MMHG | WEIGHT: 152.13 LBS | HEART RATE: 81 BPM | BODY MASS INDEX: 29.86 KG/M2 | HEIGHT: 60 IN | SYSTOLIC BLOOD PRESSURE: 115 MMHG

## 2023-12-29 DIAGNOSIS — Z30.011 INITIATION OF ORAL CONTRACEPTION: ICD-10-CM

## 2023-12-29 PROCEDURE — 3078F DIAST BP <80 MM HG: CPT

## 2023-12-29 PROCEDURE — 3074F SYST BP LT 130 MM HG: CPT

## 2023-12-29 PROCEDURE — 3008F BODY MASS INDEX DOCD: CPT

## 2023-12-29 RX ORDER — NORETHINDRONE ACETATE AND ETHINYL ESTRADIOL, ETHINYL ESTRADIOL AND FERROUS FUMARATE 1MG-10(24)
1 KIT ORAL DAILY
Qty: 28 TABLET | Refills: 11 | Status: SHIPPED | OUTPATIENT
Start: 2023-12-29 | End: 2024-12-28

## 2023-12-29 NOTE — PROGRESS NOTES
HPI    Cornelio Malloy is a 25year old female  here for 6 week post-partum visit. Patient delivered a  female infant on 11/15/2023 via . Patient desires OCP for contraception. Patient is bottle feeding. Patient denies symptoms of depression, [place]  depression scale score of 0 out of 30. She is requesting Lo Loestrin, she was taking it before the pregnancy. [place]  DEPRESSION SCALE  I have been able to laugh and see the funny side of things: As much as I always could  I have looked forward with enjoyment to things: As much as I ever did  I have been anxious or worried for no good reason: No, not at all  I have felt scared or panicky for no good reason: No, not at all  Things have been getting on top of me: No, I have been coping as well as ever  I have been so unhappy that I have had difficulty sleeping: Not at all  I have felt sad or miserable: No, not at all  I have been so unhappy that I have been crying: No, never  The thought of harming myself has occurred to me: Never  Total: 0    OBSTETRIC HISTORY  OB History    Para Term  AB Living   1 1 1 0 0 1   SAB IAB Ectopic Multiple Live Births   0 0 0 0 1      # Outcome Date GA Lbr Benjie/2nd Weight Sex Delivery Anes PTL Lv   1 Term 11/15/23 40w0d 12:08 / 01:22 7 lb 6.2 oz (3.35 kg) F NORMAL SPONT EPI N NOEMI      Complications: Other Excessive Bleeding       GYNE HISTORY        MEDICATIONS:    Current Outpatient Medications:     Norethin-Eth Estrad-Fe Biphas (LO LOESTRIN FE) 1 MG-10 MCG / 10 MCG Oral Tab, Take 1 tablet by mouth daily. , Disp: 28 tablet, Rfl: 11    docusate sodium 100 MG Oral Cap, Take 100 mg by mouth 2 (two) times daily as needed for constipation. (Patient not taking: Reported on 2023), Disp: 30 capsule, Rfl: 0    ibuprofen 600 MG Oral Tab, Take 1 tablet (600 mg total) by mouth every 6 (six) hours.  (Patient not taking: Reported on 2023), Disp: 60 tablet, Rfl: 0    ALLERGIES:  No Known Allergies    PHYSICAL EXAM  Blood pressure 115/79, pulse 81, height 60\", weight 152 lb 1.9 oz (69 kg), last menstrual period 02/08/2023, currently breastfeeding. General:  Well nourished, well developed woman in no acute distress  Abdomen:  soft, nontender, no masses  External Genitalia: normal appearance, hair distribution, and no lesions  Vagina:  Normal appearance without lesions, no abnormal discharge  Cervix:  Normal without tenderness on motion  Uterus: normal in size, contour, position, mobility, without tenderness  Adnexa: normal without masses or tenderness  Perineum: well healed perineum  Anus: no hemorroids       ASSESSMENT/PLAN  Katlin Pham was seen today for postmenopausal bleeding and contraception. Diagnoses and all orders for this visit:    Postpartum exam    Initiation of oral contraception  -     Norethin-Eth Estrad-Fe Biphas (LO LOESTRIN FE) 1 MG-10 MCG / 10 MCG Oral Tab; Take 1 tablet by mouth daily. Discussed all options of birthcontrol including ocps, minipill, Mirena or Paragard IUD, nuvaring, orthoevra patch, nexplanon, Depoprovera, condoms or tubal sterilization options. Patient has chosen OCP. Patient to return for annual gyne exam in June.

## 2024-01-04 ENCOUNTER — APPOINTMENT (OUTPATIENT)
Dept: URBAN - METROPOLITAN AREA CLINIC 242 | Age: 29
Setting detail: DERMATOLOGY
End: 2024-01-04

## 2024-01-04 DIAGNOSIS — L20.89 OTHER ATOPIC DERMATITIS: ICD-10-CM

## 2024-01-04 PROCEDURE — 99213 OFFICE O/P EST LOW 20 MIN: CPT

## 2024-01-04 PROCEDURE — OTHER COUNSELING: OTHER

## 2024-01-04 PROCEDURE — OTHER PRESCRIPTION MEDICATION MANAGEMENT: OTHER

## 2024-01-04 ASSESSMENT — LOCATION SIMPLE DESCRIPTION DERM: LOCATION SIMPLE: POSTERIOR NECK

## 2024-01-04 ASSESSMENT — LOCATION DETAILED DESCRIPTION DERM
LOCATION DETAILED: LEFT MEDIAL TRAPEZIAL NECK
LOCATION DETAILED: RIGHT INFERIOR POSTERIOR NECK

## 2024-01-04 ASSESSMENT — LOCATION ZONE DERM: LOCATION ZONE: NECK

## 2024-01-04 NOTE — PROCEDURE: PRESCRIPTION MEDICATION MANAGEMENT
Render In Strict Bullet Format?: No
Plan: Keep area dry after sports
Continue Regimen: clobetasol 0.05 % scalp solution BID for 1 more week then discontinue
Detail Level: Zone

## 2024-06-20 ENCOUNTER — OFFICE VISIT (OUTPATIENT)
Dept: OBGYN CLINIC | Facility: CLINIC | Age: 29
End: 2024-06-20

## 2024-06-20 VITALS
HEART RATE: 80 BPM | SYSTOLIC BLOOD PRESSURE: 112 MMHG | BODY MASS INDEX: 30.29 KG/M2 | DIASTOLIC BLOOD PRESSURE: 75 MMHG | HEIGHT: 60 IN | WEIGHT: 154.31 LBS

## 2024-06-20 DIAGNOSIS — M62.89 PELVIC FLOOR DYSFUNCTION IN FEMALE: ICD-10-CM

## 2024-06-20 DIAGNOSIS — Z01.419 WELL WOMAN EXAM WITH ROUTINE GYNECOLOGICAL EXAM: Primary | ICD-10-CM

## 2024-06-20 DIAGNOSIS — Z30.09 ENCOUNTER FOR COUNSELING REGARDING CONTRACEPTION: ICD-10-CM

## 2024-06-20 DIAGNOSIS — Z11.3 SCREEN FOR STD (SEXUALLY TRANSMITTED DISEASE): ICD-10-CM

## 2024-06-20 DIAGNOSIS — Z12.4 SCREENING FOR CERVICAL CANCER: ICD-10-CM

## 2024-06-20 PROCEDURE — 87625 HPV TYPES 16 & 18 ONLY: CPT | Performed by: STUDENT IN AN ORGANIZED HEALTH CARE EDUCATION/TRAINING PROGRAM

## 2024-06-20 PROCEDURE — 99395 PREV VISIT EST AGE 18-39: CPT | Performed by: STUDENT IN AN ORGANIZED HEALTH CARE EDUCATION/TRAINING PROGRAM

## 2024-06-20 PROCEDURE — 3074F SYST BP LT 130 MM HG: CPT | Performed by: STUDENT IN AN ORGANIZED HEALTH CARE EDUCATION/TRAINING PROGRAM

## 2024-06-20 PROCEDURE — 87624 HPV HI-RISK TYP POOLED RSLT: CPT | Performed by: STUDENT IN AN ORGANIZED HEALTH CARE EDUCATION/TRAINING PROGRAM

## 2024-06-20 PROCEDURE — 3008F BODY MASS INDEX DOCD: CPT | Performed by: STUDENT IN AN ORGANIZED HEALTH CARE EDUCATION/TRAINING PROGRAM

## 2024-06-20 PROCEDURE — 87591 N.GONORRHOEAE DNA AMP PROB: CPT | Performed by: STUDENT IN AN ORGANIZED HEALTH CARE EDUCATION/TRAINING PROGRAM

## 2024-06-20 PROCEDURE — 87491 CHLMYD TRACH DNA AMP PROBE: CPT | Performed by: STUDENT IN AN ORGANIZED HEALTH CARE EDUCATION/TRAINING PROGRAM

## 2024-06-20 PROCEDURE — 3078F DIAST BP <80 MM HG: CPT | Performed by: STUDENT IN AN ORGANIZED HEALTH CARE EDUCATION/TRAINING PROGRAM

## 2024-06-20 NOTE — PROGRESS NOTES
[facility]  Obstetrics and Gynecology   History & Physical      Chief complaint:   Chief Complaint   Patient presents with    Wellness Visit     Last pap smear was 22, negative     Contraception     Refill needed, pharmacy confirmed.       Subjective:     HPI: Jada Diop is a 28 year old  presenting for [employer]  Uses the following pronouns: she/her.    Her PCP is Sera Noble DO.    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    Today, patient would like to discuss:    Menstrual history:  - Menarche: 10  - Current menses: every 28 days lasts 3 days on the pill   - Number of tampons/pads on heaviest days: 2  - Dysmenorrhea: no  - Patient's last menstrual period was 2024 (exact date).    Pelvic pain: no     Family planning / Preconception  - How many children do you desire? 2-3  - Would you like to get pregnant in the next year? No - in the next 2-3 years  - Taking a prenatal vitamin? no  - Interested in genetic screening? Did carrier screening for herself and it was negative     Sexual history:  - Currently sexually active? Yes   - Satisfied with current sexual activity: yes  - Preference: men   - STD history: chlamydia and HPV - treated for chlamydia with negative MAYRA  - Interested in STD testing today?: yes   - Pain on insertion? Not with lubrication  - water based  - Pain on deep penetration?: no  - Post coital bleeding: no    Other PCOS screen  - hirsutism, acne, male-pattern hair loss? no  - sleep apnea: snoring, excessive daytime somnolence, morning Has - no     Birth control history:  - Current BC: lo lo estrin   - Past BC: loestrin - did not get a period at all  - Interested in BC: wants to keep her current BC    Gynecologic Hygiene:  - Vulvar: Water, soap   - Douche?: no  - Cotton underwear: yes  - Detergent: regular tide     Cancer Screening:  Family history of ovarian/endometrial/cervical/breast cancer?   - Paternal aunt with breast cancer in her 60s   Cervical CA:   - last pap/HPV   - neg   - due for one today? :  no but history of HPV and would like testing today.  Never got a colpo  - HPV vaccine status: yes in her 20s  Breast cancer:   - any breast issues today?: no   - last mammogram N/A   - due for mammogram today? : N/A  Colon cancer:   - family history no   - last colonoscopy: N/A    Other screening:  Osteoporosis:   - family history: no   - last DEXA: N/A  Lifestyle:   - Nutrition: incorporates whole food/plant based foods to their diet as best as she can   - Exercise: 4-5 times per week   - Sleep: 7 hours but interrupted - not breastfeeding   - Stress mgt / coping strategies: talks w her , alone time, read (fiction, fantasy, romance)   - People who support your health: , her dad > mom (freaks out about stuff)     OB History  OB History    Para Term  AB Living   1 1 1 0 0 1   SAB IAB Ectopic Multiple Live Births   0 0 0 0 1     OB History    Para Term  AB Living   1 1 1 0 0 1   SAB IAB Ectopic Multiple Live Births   0 0 0 0 1      # Outcome Date GA Lbr Benjie/2nd Weight Sex Type Anes PTL Lv   1 Term 11/15/23 40w0d 12:08 / 01:22 7 lb 6.2 oz (3.35 kg) F NORMAL SPONT EPI N NOEMI      Complications: Other Excessive Bleeding       ROS negative unless otherwise stated above    Meds:  Current Outpatient Medications on File Prior to Visit   Medication Sig Dispense Refill    Norethin-Eth Estrad-Fe Biphas (LO LOESTRIN FE) 1 MG-10 MCG / 10 MCG Oral Tab Take 1 tablet by mouth daily. 28 tablet 11    docusate sodium 100 MG Oral Cap Take 100 mg by mouth 2 (two) times daily as needed for constipation. (Patient not taking: Reported on 2023) 30 capsule 0    ibuprofen 600 MG Oral Tab Take 1 tablet (600 mg total) by mouth every 6 (six) hours. (Patient not taking: Reported on 2023) 60 tablet 0     No current facility-administered medications on file prior to visit.       PMH:  Past Medical History:    Overweight (BMI 25.0-29.9)    Pap smear for cervical  cancer screening    Pap neg - Chelo Gama MD. CareEverywhere    Sexually transmitted disease       All:  No Known Allergies    PSH:  Past Surgical History:   Procedure Laterality Date    Chandler teeth removed         Social History:  Social History     Socioeconomic History    Marital status:    Tobacco Use    Smoking status: Never    Smokeless tobacco: Never   Vaping Use    Vaping status: Never Used   Substance and Sexual Activity    Alcohol use: Not Currently     Comment: socially     Drug use: Never    Sexual activity: Yes     Partners: Male     Birth control/protection: Pill   Other Topics Concern    Seat Belt Yes     Social Determinants of Health     Financial Resource Strain: Low Risk  (11/14/2023)    Financial Resource Strain     Difficulty of Paying Living Expenses: Not hard at all     Med Affordability: No   Food Insecurity: No Food Insecurity (11/14/2023)    Food Insecurity     Food Insecurity: Never true   Transportation Needs: No Transportation Needs (11/14/2023)    Transportation Needs     Lack of Transportation: No   Stress: No Stress Concern Present (11/14/2023)    Stress     Feeling of Stress : No   Housing Stability: Low Risk  (11/14/2023)    Housing Stability     Housing Instability: No        Family History:  Family History   Problem Relation Age of Onset    High Blood Pressure Mother     High Blood Pressure Father     No Known Problems Brother     Other (Other) Maternal Grandmother         Renal Failure    Diabetes Maternal Grandmother     Diabetes Paternal Grandmother     Other (Bladder cancer) Paternal Grandmother         non smoker    Breast Cancer Paternal Aunt         probably 60s    Genetic Disease Neg     Birth Defects Neg     Colon Cancer Neg     Ovarian Cancer Neg        Immunization History:  Immunization History   Administered Date(s) Administered    Covid-19 Vaccine Pfizer 30 mcg/0.3 ml 02/26/2021, 03/19/2021    DTAP INFANRIX 03/12/1994, 05/21/1994, 07/30/1994, 08/05/1995,  08/12/1999    FLU VAC QIV SPLIT 3 YRS AND OLDER (58059) 09/20/2021    FLUZONE 6 months and older PFS 0.5 ml (63157) 10/03/2023    Hep B, Unspecified Formulation 01/28/1994, 07/30/1994    Hib, Unspecified Formulation 03/12/1994, 05/21/1994, 07/30/1994, 04/22/1995    IPV 03/12/1994, 05/21/1994, 08/05/1995, 08/12/1999    Influenza 10/13/2020    MMR 04/22/1995, 08/12/1999    Meningococcal-Menactra 08/02/2008    TDAP 08/02/2008, 02/02/2021, 08/23/2023         Objective:     Vitals:    06/20/24 0829   BP: 112/75   Pulse: 80   Weight: 154 lb 5.2 oz (70 kg)   Height: 60\"       Body mass index is 30.14 kg/m².    Physical Exam:     General: normal appearance  HEENT: normocephalic, no male pattern baldness, no acne  Breast\: normal contour, no masses or lesions, no nipple discharge, chest hair not seen  Respiratory: normal work of breathing, no extra use of accessory muscles  Cardiac: normal rate  Abdominal: Nontender to palpation, no masses palpated  MSK: normal range of motion  Neuro: normal movement, normal sensory  Skin: no abnormalities seen    Pelvic:  Speculum Exam:  - normal appearing vulva, perineum, anus - scarring from 2nd degree tear well healed - tiny divet outside the posterior hymen   - normal appearing urethral meatus, urethra  - Stage V rosey pubic hair development  - normal appearing vagina, well estrogenized with ruggae, physiologic discharge  - multiparous cervix without masses   Bimanual exam:  - uterus is mobile and with normal descent.  No masses appreciated.  No uterine or adnexal tenderness.  No bladder pain  - Pelvic floor is non tender  - Pelvic floor strength 3 out of 5.     Swabs: pap + HPV      Labs:  Lab Results   Component Value Date    WBC 15.4 (H) 11/16/2023    RBC 3.18 (L) 11/16/2023    HGB 9.9 (L) 11/16/2023    HCT 28.9 (L) 11/16/2023    MCV 90.9 11/16/2023    MCH 31.1 11/16/2023    MCHC 34.3 11/16/2023    RDW 12.1 11/16/2023    .0 11/16/2023        Lab Results   Component Value Date     GLU 72 2023    BUN 15 2023    CREATSERUM 0.83 2023    ANIONGAP 9 2023    CA 9.4 2023    OSMOCALC 277 2023    ALKPHO 131 (H) 2023    AST 18 2023    ALT 13 2023    BILT 0.8 2023    TP 7.9 2023    ALB 3.4 2023    GLOBULIN 4.5 (H) 2023     (L) 2023    K 3.8 2023     2023    CO2 20.0 (L) 2023       No results found for: \"CHOLEST\", \"TRIG\", \"HDL\", \"LDL\", \"VLDL\", \"TCHDLRATIO\", \"NONHDLC\", \"CHOLHDLRATIO\", \"CALCNONHDL\"     No results found for: \"T4F\", \"TSH\", \"TSHT4\", \"FSH\", \"LH\", \"PL\", \"AMH\"     Lab Results   Component Value Date    EAG  2023      Comment:      Hemoglobin A1C to be confirmed at [facility]    A1C  2023      Comment:      Hemoglobin A1C to be confirmed at [facility]    A1C <4.2 (L) 2023         Imaging:  No results found.     Assessment:     Jada Diop is a 28 year old  female presenting for [employer].    #WWE  [ ] f/u CBC, CMP, lipids, A1C, vit D, TSH, HA1C    #contraception  Will continue lo lo estrin   Will call when needs refills    #pelvic floor dysfunction  Weakened after childbirth  [ ] pelvic PT referral placed    #STD screening  [ ] f/u STD panel    #Cervical Cancer Screening  [ ] f/u Pap and HPV  [x] gardasil vaccine (available from 9 to 46 yo)    #Breast Cancer Screening  Breast exam    #Lifestyle counseling based on recommendations from the American College of Lifestyle Medicine  1) Nutrition: extensive scientific evidence supports a diet that is predominantly whole-food and plant based as an important strategy in health optimization.  Such a diet is rich in fiber, antioxidants and is nutrient dense (ex. Minimally processed vegetables, fruits, whole grains, legumes, nuts and seeds)  2) Physical activity: at least 150 minutes of moderate exercise per week (anything that gets your heart beating faster).  You could divide this time into 30 minutes per day for 5 days.   2 of these days should be devoted to whole body muscle-strengthening/building activities (weight lifting, for example).  3) Sleep: 7 to 9 hours per night of restorative sleep.  You can use black out curtains, white noise machine and minimize screen time to do this.    4) Continue to avoid or limit risky substances like alcohol, nicotine, vaping, drugs, prescription opioids.  If you need help - through medication or counseling - to do this, please contact me so I can get you a referral or resources to support you.  5) Stress: Continue developing coping strategies to decrease stress.  6) Leverage the power of relationships and social networks to help reinforce health behaviors.  Studies show people are more successful at achieving health goals if the people they live with are supporting and even working towards those same health goals.    Return of care in 1 year or PRN     Misty Sandoval MD   [facility] - OBGYN    Note to patient and family:  The 21st Century Cures Act makes medical notes available to patients in the interest of transparency.  However, please be advised that this is a medical document.  It is intended as a peer to peer communication.  It is written in medical language and may contain abbreviations or verbiage that are technical and unfamiliar.  It may appear blunt or direct.  Medical documents are intended to carry relevant information, facts as evident, and the clinical opinion of the practitioner.

## 2024-06-21 LAB
C TRACH DNA SPEC QL NAA+PROBE: NEGATIVE
N GONORRHOEA DNA SPEC QL NAA+PROBE: NEGATIVE

## 2024-06-22 LAB — HPV E6+E7 MRNA CVX QL NAA+PROBE: POSITIVE

## 2024-06-25 ENCOUNTER — LAB ENCOUNTER (OUTPATIENT)
Dept: LAB | Age: 29
End: 2024-06-25
Attending: STUDENT IN AN ORGANIZED HEALTH CARE EDUCATION/TRAINING PROGRAM

## 2024-06-25 DIAGNOSIS — Z11.3 SCREEN FOR STD (SEXUALLY TRANSMITTED DISEASE): ICD-10-CM

## 2024-06-25 DIAGNOSIS — Z01.419 WELL WOMAN EXAM WITH ROUTINE GYNECOLOGICAL EXAM: ICD-10-CM

## 2024-06-25 LAB
ALBUMIN SERPL-MCNC: 3.8 G/DL (ref 3.4–5)
ALBUMIN/GLOB SERPL: 1 {RATIO} (ref 1–2)
ALP LIVER SERPL-CCNC: 66 U/L
ALT SERPL-CCNC: 15 U/L
ANION GAP SERPL CALC-SCNC: 6 MMOL/L (ref 0–18)
AST SERPL-CCNC: 12 U/L (ref 15–37)
BASOPHILS # BLD AUTO: 0.05 X10(3) UL (ref 0–0.2)
BASOPHILS NFR BLD AUTO: 0.7 %
BILIRUB SERPL-MCNC: 1.1 MG/DL (ref 0.1–2)
BUN BLD-MCNC: 17 MG/DL (ref 9–23)
CALCIUM BLD-MCNC: 8.6 MG/DL (ref 8.5–10.1)
CHLORIDE SERPL-SCNC: 109 MMOL/L (ref 98–112)
CHOLEST SERPL-MCNC: 147 MG/DL (ref ?–200)
CO2 SERPL-SCNC: 24 MMOL/L (ref 21–32)
CREAT BLD-MCNC: 0.87 MG/DL
EGFRCR SERPLBLD CKD-EPI 2021: 92 ML/MIN/1.73M2 (ref 60–?)
EOSINOPHIL # BLD AUTO: 0.27 X10(3) UL (ref 0–0.7)
EOSINOPHIL NFR BLD AUTO: 3.7 %
ERYTHROCYTE [DISTWIDTH] IN BLOOD BY AUTOMATED COUNT: 10.8 %
FASTING PATIENT LIPID ANSWER: YES
FASTING STATUS PATIENT QL REPORTED: YES
GLOBULIN PLAS-MCNC: 3.8 G/DL (ref 2.8–4.4)
GLUCOSE BLD-MCNC: 71 MG/DL (ref 70–99)
HBV SURFACE AG SER-ACNC: <0.1 [IU]/L
HBV SURFACE AG SERPL QL IA: NONREACTIVE
HCT VFR BLD AUTO: 39.5 %
HCV AB SERPL QL IA: NONREACTIVE
HDLC SERPL-MCNC: 50 MG/DL (ref 40–59)
HGB BLD-MCNC: 14 G/DL
HPV16 DNA CVX QL PROBE+SIG AMP: NEGATIVE
HPV18 DNA CVX QL PROBE+SIG AMP: POSITIVE
IMM GRANULOCYTES # BLD AUTO: 0.02 X10(3) UL (ref 0–1)
IMM GRANULOCYTES NFR BLD: 0.3 %
LDLC SERPL CALC-MCNC: 81 MG/DL (ref ?–100)
LYMPHOCYTES # BLD AUTO: 1.8 X10(3) UL (ref 1–4)
LYMPHOCYTES NFR BLD AUTO: 24.5 %
MCH RBC QN AUTO: 31.7 PG (ref 26–34)
MCHC RBC AUTO-ENTMCNC: 35.4 G/DL (ref 31–37)
MCV RBC AUTO: 89.6 FL
MONOCYTES # BLD AUTO: 0.37 X10(3) UL (ref 0.1–1)
MONOCYTES NFR BLD AUTO: 5 %
NEUTROPHILS # BLD AUTO: 4.85 X10 (3) UL (ref 1.5–7.7)
NEUTROPHILS # BLD AUTO: 4.85 X10(3) UL (ref 1.5–7.7)
NEUTROPHILS NFR BLD AUTO: 65.8 %
NONHDLC SERPL-MCNC: 97 MG/DL (ref ?–130)
OSMOLALITY SERPL CALC.SUM OF ELEC: 288 MOSM/KG (ref 275–295)
PLATELET # BLD AUTO: 311 10(3)UL (ref 150–450)
POTASSIUM SERPL-SCNC: 4.1 MMOL/L (ref 3.5–5.1)
PROT SERPL-MCNC: 7.6 G/DL (ref 6.4–8.2)
RBC # BLD AUTO: 4.41 X10(6)UL
SODIUM SERPL-SCNC: 139 MMOL/L (ref 136–145)
T PALLIDUM AB SER QL IA: NONREACTIVE
TRIGL SERPL-MCNC: 81 MG/DL (ref 30–149)
TSI SER-ACNC: 4.53 MIU/ML (ref 0.36–3.74)
VIT D+METAB SERPL-MCNC: 14 NG/ML (ref 30–100)
VLDLC SERPL CALC-MCNC: 13 MG/DL (ref 0–30)
WBC # BLD AUTO: 7.4 X10(3) UL (ref 4–11)

## 2024-06-25 PROCEDURE — 80061 LIPID PANEL: CPT | Performed by: STUDENT IN AN ORGANIZED HEALTH CARE EDUCATION/TRAINING PROGRAM

## 2024-06-25 PROCEDURE — 82306 VITAMIN D 25 HYDROXY: CPT | Performed by: STUDENT IN AN ORGANIZED HEALTH CARE EDUCATION/TRAINING PROGRAM

## 2024-06-25 PROCEDURE — 80050 GENERAL HEALTH PANEL: CPT | Performed by: STUDENT IN AN ORGANIZED HEALTH CARE EDUCATION/TRAINING PROGRAM

## 2024-06-25 PROCEDURE — 86803 HEPATITIS C AB TEST: CPT | Performed by: STUDENT IN AN ORGANIZED HEALTH CARE EDUCATION/TRAINING PROGRAM

## 2024-06-25 PROCEDURE — 86780 TREPONEMA PALLIDUM: CPT | Performed by: STUDENT IN AN ORGANIZED HEALTH CARE EDUCATION/TRAINING PROGRAM

## 2024-06-25 PROCEDURE — 83036 HEMOGLOBIN GLYCOSYLATED A1C: CPT | Performed by: STUDENT IN AN ORGANIZED HEALTH CARE EDUCATION/TRAINING PROGRAM

## 2024-06-25 PROCEDURE — 87340 HEPATITIS B SURFACE AG IA: CPT | Performed by: STUDENT IN AN ORGANIZED HEALTH CARE EDUCATION/TRAINING PROGRAM

## 2024-06-26 DIAGNOSIS — E03.9 HYPOTHYROIDISM, UNSPECIFIED TYPE: Primary | ICD-10-CM

## 2024-06-26 LAB
.: NORMAL
.: NORMAL
HGBA1C: 4.2 %

## 2024-07-23 ENCOUNTER — TELEPHONE (OUTPATIENT)
Facility: CLINIC | Age: 29
End: 2024-07-23

## 2024-08-01 ENCOUNTER — OFFICE VISIT (OUTPATIENT)
Dept: OBGYN CLINIC | Facility: CLINIC | Age: 29
End: 2024-08-01
Payer: COMMERCIAL

## 2024-08-01 VITALS
HEIGHT: 61 IN | BODY MASS INDEX: 29.58 KG/M2 | SYSTOLIC BLOOD PRESSURE: 121 MMHG | DIASTOLIC BLOOD PRESSURE: 83 MMHG | HEART RATE: 73 BPM | WEIGHT: 156.69 LBS

## 2024-08-01 DIAGNOSIS — Z98.890 STATUS POST COLPOSCOPY: ICD-10-CM

## 2024-08-01 DIAGNOSIS — R87.810 HUMAN PAPILLOMAVIRUS (HPV) TYPE 18 DNA DETECTED IN CERVICAL SPECIMEN: Primary | ICD-10-CM

## 2024-08-01 DIAGNOSIS — Z01.812 PRE-PROCEDURAL LABORATORY EXAMINATION: ICD-10-CM

## 2024-08-01 DIAGNOSIS — R87.810 CERVICAL HIGH RISK HUMAN PAPILLOMAVIRUS (HPV) DNA TEST POSITIVE: ICD-10-CM

## 2024-08-01 LAB
CONTROL LINE PRESENT WITH A CLEAR BACKGROUND (YES/NO): YES YES/NO
KIT LOT #: NORMAL NUMERIC
PREGNANCY TEST, URINE: NEGATIVE

## 2024-08-01 PROCEDURE — 3008F BODY MASS INDEX DOCD: CPT | Performed by: STUDENT IN AN ORGANIZED HEALTH CARE EDUCATION/TRAINING PROGRAM

## 2024-08-01 PROCEDURE — 81025 URINE PREGNANCY TEST: CPT | Performed by: STUDENT IN AN ORGANIZED HEALTH CARE EDUCATION/TRAINING PROGRAM

## 2024-08-01 PROCEDURE — 88305 TISSUE EXAM BY PATHOLOGIST: CPT | Performed by: STUDENT IN AN ORGANIZED HEALTH CARE EDUCATION/TRAINING PROGRAM

## 2024-08-01 PROCEDURE — 3074F SYST BP LT 130 MM HG: CPT | Performed by: STUDENT IN AN ORGANIZED HEALTH CARE EDUCATION/TRAINING PROGRAM

## 2024-08-01 PROCEDURE — 57454 BX/CURETT OF CERVIX W/SCOPE: CPT | Performed by: STUDENT IN AN ORGANIZED HEALTH CARE EDUCATION/TRAINING PROGRAM

## 2024-08-01 PROCEDURE — 3079F DIAST BP 80-89 MM HG: CPT | Performed by: STUDENT IN AN ORGANIZED HEALTH CARE EDUCATION/TRAINING PROGRAM

## 2024-08-01 NOTE — PROCEDURES
Procedure:  1) Colposcopy   2) Endocervical curettage    Indication:   6/20/24: positive HPV 18/45, neg pap smear  1/2/2022: neg pap smear  5/19/21: neg pap smear   5/12/20: neg pap smear  4/2/19: neg pap smear   Reports HPV positive test when she was around 19 yo.    Pre procedure:   Informed consent obtained?: yes  Immunosuppressed?: no  Smoker?:no  Recent pelvic infection: no  Gardasil status (can get until age 46 yo): per patient in her 20s    Procedure: After neg UPT, patient was placed in lithotomy.  Speculum was inserted in the vagina.  Vaginal walls and cervix were washed with acetic acid.  SCJ was visualized.  Biopsies were taken from 12 and 9 o clock.  ECC was done.  Monsels was used for hemostasis.  Patient tolerated procedure well.    Findings:   - normal appearing external genitalia  - normal appearing, well estrogenized vaginal walls  - pre-acetic acid: multiparous cervix without lesions or masses  - post-acetic acid: acetowhite seen at 12 and 9    Post Colpo Instructions:  - Experiencing vaginal pain for a few days is normal  - Use a pad to catch any blood or discharge  - Call office if bleeding heavier than a period, chills, fever or severe abdominal pain  - Pelvic rest (nothing in the vagina - douching, vaginal sex or tampons) x1 week  - Patient will be notified of findings and follow up either via the patient portal or a phone call

## 2024-11-06 ENCOUNTER — PATIENT MESSAGE (OUTPATIENT)
Dept: OBGYN CLINIC | Facility: CLINIC | Age: 29
End: 2024-11-06

## 2024-11-07 RX ORDER — NORETHINDRONE ACETATE AND ETHINYL ESTRADIOL .02; 1 MG/1; MG/1
1 TABLET ORAL DAILY
Qty: 84 TABLET | Refills: 3 | Status: SHIPPED | OUTPATIENT
Start: 2024-11-07 | End: 2025-11-07

## (undated) NOTE — LETTER
[facility] Department of OB/GYN  After Care Instructions for Colposcopy/Biopsy      Biopsy Results   You will receive a phone call with your biopsy results in 7 business days.  If you have not received your biopsy results in 7 days, please contact our office.  Your biopsy results will help the physician decide if and what further treatment is  necessary.    Bleeding   After your biopsy, the area is swabbed with a brown solution to help stop any bleeding.  For this reason, you may notice a brown or black clotty discharge lasting several days.  If you experience bright red bleeding that is heavy, you should call the office.     Restrictions    You should avoid douching, intercourse and tampon use for 3 days following your procedure.    Pain    If you are uncomfortable after the procedure, you may use Aleve, Tylenol or Ibuprofen for the discomfort.        If you have additional questions or concerns, please call us at [phone].

## (undated) NOTE — LETTER
Jada Diop, :1995    CONSENT FOR PROCEDURE/SEDATION    1. I authorize the performance upon Jada Diop  the following:  Colposcopy     2. I authorize Dr. Misty Sandoval MD (and whomever is designated as the doctor’s assistant), to perform the above-mentioned procedures.    3. If any unforeseen conditions arise during this procedure calling for additional  procedures, operations, or medications (including anesthesia and blood transfusion), I further request and authorize the doctor to do whatever he/she deems advisable in my interest.    4. I consent to the taking and reproduction of any photographs in the course of this procedure for professional purposes.    5. I consent to the administration of such sedation as may be considered necessary or advisable by the physician responsible for this service, with the exception of ______________________________________________________    6. I have been informed by my doctor of the nature and purpose of this procedure sedation, possible alternative methods of treatment, risk involved and possible complications.    7. If I have a Do Not Resuscitate (DNR) order in place, the physician and I (or the individual authorized to consent on my behalf) will discuss and agree as to whether the Do Not Resuscitate (DNR) order will remain in effect or will be discontinued during the performance of the procedure and the applicable recovery period. If the Do Not Resuscitate (DNR) order is discontinued and is to be reinstated following the procedure/recovery period, the physician will determine when the applicable recovery period ends for purposes of reinstating the Do Not Resuscitate (DNR) order.    Signature of Patient:_______________________________________________    Signature of person authorized to consent for patient:  _______________________________________________________________    Relationship to patient:  ____________________________________________    Witness: _________________________________________ Date:___________     Physician Signature: _______________________________ Date:___________